# Patient Record
Sex: MALE | Race: BLACK OR AFRICAN AMERICAN | Employment: UNEMPLOYED | URBAN - METROPOLITAN AREA
[De-identification: names, ages, dates, MRNs, and addresses within clinical notes are randomized per-mention and may not be internally consistent; named-entity substitution may affect disease eponyms.]

---

## 2022-04-07 ENCOUNTER — HOSPITAL ENCOUNTER (OUTPATIENT)
Dept: PHYSICAL THERAPY | Age: 37
Discharge: HOME | End: 2022-04-07
Payer: MEDICAID

## 2022-04-07 PROCEDURE — 97014 ELECTRIC STIMULATION THERAPY: CPT

## 2022-04-07 PROCEDURE — 97161 PT EVAL LOW COMPLEX 20 MIN: CPT

## 2022-04-07 PROCEDURE — 97112 NEUROMUSCULAR REEDUCATION: CPT
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
## 2022-04-13 ENCOUNTER — HOSPITAL ENCOUNTER (OUTPATIENT)
Dept: PHYSICAL THERAPY | Age: 37
Discharge: HOME | End: 2022-04-13
Payer: MEDICAID

## 2022-04-13 PROCEDURE — 97110 THERAPEUTIC EXERCISES: CPT

## 2022-04-13 PROCEDURE — 97112 NEUROMUSCULAR REEDUCATION: CPT

## 2022-04-13 PROCEDURE — 97014 ELECTRIC STIMULATION THERAPY: CPT

## 2022-04-13 NOTE — PROGRESS NOTES
PT DAILY TREATMENT NOTE  NON [facility]     Patient Name: Marcello Estrada  KBUZ:3/56/2034  : 1985  [x]  Patient  Verified  Payor: Aquilino Wheeler / Plan: Nas Soria / Product Type: Managed Care Medicaid /    Treatment Area: Other low back pain [M54.59]  Low back pain, unspecified [M54.50]  Other chronic pain [G89.29]       Next MD APPT:   In time: 3:45   Out time: 4:45     Total Treatment Time (min): 55   Visit #: -     SUBJECTIVE  Pain Level (0-10 scale) pre treatment: 2/10       Pain Level (0-10 scale) post treatment:     Any medication changes, allergies to medications, adverse drug reactions, diagnosis change, or new procedure performed?:   [x] No    [] Yes (see summary sheet for update)    Subjective functional status/changes:   [x] No changes reported  Reports minimal back pain, stated he has been doing his exercises and they have been helping. Has been taking his pain medicaion at needed.      OBJECTIVE    Modality rationale: decrease edema, decrease inflammation, decrease pain, increase tissue extensibility and increase muscle contraction/control to improve the patients ability to reduce pain with ambulation and ADLs   Min Type Additional Details   15 [x] Estim: []UnAtt   []Att       []TENS instruct                  []IFC  []Premod   []NMES                     []Other:  []w/US   []w/ice   [x]w/heat  Position:supine with wedge   Location:lower back     []  Ice     []  Heat  []  Ice massage Position:  Location:    []  Traction: [] Cervical       []Lumbar                       [] Prone          []Supine                       []Intermittent   []Continuous Lbs:  []w/heat  []W/heat and Estim    []  Ultrasound: []Continuous   [] Pulsed at:                           []1MHz   []3MHz Location:  W/cm2:      [x] Skin assessment post-treatment:   [x]intact  []redness- no adverse reaction   []redness  adverse reaction:   32 min Therapeutic Exercise:  [] See flow sheet :   Rationale: increase ROM, increase strength, improve coordination, improve balance and increase proprioception to improve the patients ability to ambulate with less back pain. 8 min Neuromuscular Re-education:  []  See flow sheet : PPT, TA bracing and LTR    Rationale: increase ROM, increase strength, improve coordination, improve balance and increase proprioception  to improve the patients ability to reduce pain with ambulation and ADLs      With   [x] TE   [] TA   [] Neuro   [] SC   [] other: Patient Education: [] Review HEP    [] Progressed/Changed HEP based on:   [x] positioning   [] body mechanics   [] transfers   [] Use of heat/ice     [] other:         Other Objective/Functional Measures: We continue with POC and added bike, slant board, forward lunge, 3 way forwards back stretch and wheel. ASSESSMENT/Changes in Function:   Patient tolerated session well, reports no pain or noted any discomfort during exercises. We required verbal/tactile cues with PPT, LTR and TA bracing for technique and decreased compensation . We finish session with heat and ES. Patient reports no pain post session. Encouraged to keep doing his exercises at home   Patient will continue to benefit from skilled PT services to modify and progress therapeutic interventions, address functional mobility deficits, address ROM deficits, address strength deficits, analyze and address soft tissue restrictions, analyze and cue movement patterns, analyze and modify body mechanics/ergonomics and assess and modify postural abnormalities to attain remaining goals. GOALS/Progress towards goals:    Patient Goal (s): more flexibility    []? Met []? Not met []? Partially met     Short Term Goals: To be accomplished in 6 treatments. 1. Patient will report compliance to a progressive HEP. [x] Met [] Not met [] Partially met 4/13   2. Patient will report decrease in pain intensity to a maximum of 5/10. [] Met [] Not met [] Partially met     3.  Patient will demonstrate understanding of proper body mechanics for bending and lifting light objects. [] Met [] Not met [] Partially met     Long Term Goals: To be accomplished in 12-18 treatments. 1. Patient will stand up from a chair without increased pain symptoms. 2. Patient will tolerate ambulating community distances without increased pain. 3. Patient will demo ability to  >20 lbs from floor without increased pain. 4. Patient will report decrease in overall pain to average of 1-2/10 at maximum.     Frequency / Duration: Patient to be seen 2 times per week for 12-18 treatments.   Certification Period: 4/7/22 - 7/6/22    PLAN  []  Continue plan of care  []  Upgrade activities as tolerated      []  Update interventions per flow sheet       []  Discharge due to:  []  Other:    Nadean Seip, PT, DPT 4/13/2022

## 2022-04-20 ENCOUNTER — HOSPITAL ENCOUNTER (OUTPATIENT)
Dept: PHYSICAL THERAPY | Age: 37
Discharge: HOME | End: 2022-04-20
Payer: MEDICAID

## 2022-04-20 PROCEDURE — 97112 NEUROMUSCULAR REEDUCATION: CPT

## 2022-04-20 PROCEDURE — 97110 THERAPEUTIC EXERCISES: CPT

## 2022-04-20 NOTE — PROGRESS NOTES
PT DAILY TREATMENT NOTE  NON [facility]     Patient Name: Herbert Schroeder  Date:2022  : 1985  [x]  Patient  Verified  Payor: Rosanna Maier / Plan: [id number] / Product Type: Managed Care Medicaid /    Treatment Area: Other low back pain [M54.59]  Low back pain, unspecified [M54.50]  Other chronic pain [G89.29]       Next MD APPT:   In time:  Out time:     Total Treatment Time (min): 30  Visit #: 3/12-18     SUBJECTIVE  Pain Level (0-10 scale) pre treatment: 2/10       Pain Level (0-10 scale) post treatment:     Any medication changes, allergies to medications, adverse drug reactions, diagnosis change, or new procedure performed?:   [x] No    [] Yes (see summary sheet for update)    Subjective functional status/changes:   [x] No changes reported  Pt reports he is doing HEP and it helps. 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     OBJECTIVE    Modality rationale: decrease edema, decrease inflammation, decrease pain, increase tissue extensibility and increase muscle contraction/control to improve the patients ability to reduce pain with ambulation and ADLs   Min Type Additional Details    [x] Estim: []UnAtt   []Att       []TENS instruct                  []IFC  []Premod   []NMES                     []Other:  []w/US   []w/ice   [x]w/heat  Position:supine with wedge   Location:lower back     []  Ice     []  Heat  []  Ice massage Position:  Location:    []  Traction: [] Cervical       []Lumbar                       [] Prone          []Supine                       []Intermittent   []Continuous Lbs:  []w/heat  []W/heat and Estim    []  Ultrasound: []Continuous   [] Pulsed at:                           []1MHz   []3MHz Location:  W/cm2:      [x] Skin assessment post-treatment:   [x]intact  []redness- no adverse reaction   []redness  adverse reaction:   15 min Therapeutic Exercise:  [x] See flow sheet :   Rationale: increase ROM, increase strength, improve coordination, improve balance and increase proprioception to improve the patients ability to ambulate with less back pain. 15 min Neuromuscular Re-education:  [x]  See flow sheet : PPT, TA bracing and LTR , posture education   Rationale: increase ROM, increase strength, improve coordination, improve balance and increase proprioception  to improve the patients ability to reduce pain with ambulation and ADLs      With   [x] TE   [] TA   [] Neuro   [] SC   [] other: Patient Education: [x] Review HEP    [] Progressed/Changed HEP based on:   [] positioning   [x] body mechanics   [] transfers   [] Use of heat/ice     [] other:         Other Objective/Functional Measures:  Pt was instructed in the use of proper body mechanics for lifting and doing USP work  Ex per flow sheet, hip ext done leaning onto plinth     ASSESSMENT/Changes in Function: Pt has poor core strength and body mechanics. Was unalbe to lie prone due to c/o breathing issues  Patient will continue to benefit from skilled PT services to modify and progress therapeutic interventions, address functional mobility deficits, address ROM deficits, address strength deficits, analyze and address soft tissue restrictions, analyze and cue movement patterns, analyze and modify body mechanics/ergonomics and assess and modify postural abnormalities to attain remaining goals. GOALS/Progress towards goals:    Patient Goal (s): more flexibility    []? Met []? Not met []? Partially met     Short Term Goals: To be accomplished in 6 treatments. 1. Patient will report compliance to a progressive HEP. [x] Met [] Not met [] Partially met 4/13   2. Patient will report decrease in pain intensity to a maximum of 5/10. [] Met [] Not met [] Partially met     3. Patient will demonstrate understanding of proper body mechanics for bending and lifting light objects. [] Met [] Not met [] Partially met     Long Term Goals: To be accomplished in 12-18 treatments.   1. Patient will stand up from a chair without increased pain symptoms. 2. Patient will tolerate ambulating community distances without increased pain. 3. Patient will demo ability to  >20 lbs from floor without increased pain. 4. Patient will report decrease in overall pain to average of 1-2/10 at maximum.     Frequency / Duration: Patient to be seen 2 times per week for 12-18 treatments.   Certification Period: 4/7/22 - 7/6/22    PLAN  [x]  Continue plan of care  [x]  Upgrade activities as tolerated      [x]  Update interventions per flow sheet       []  Discharge due to:  [x]  Other: continue with body mechanics training    Rosangela Hummel, PT 4/20/2022

## 2022-04-22 ENCOUNTER — HOSPITAL ENCOUNTER (OUTPATIENT)
Dept: PHYSICAL THERAPY | Age: 37
Discharge: HOME | End: 2022-04-22
Payer: MEDICAID

## 2022-04-22 PROCEDURE — 97110 THERAPEUTIC EXERCISES: CPT

## 2022-04-22 PROCEDURE — 97112 NEUROMUSCULAR REEDUCATION: CPT

## 2022-04-22 PROCEDURE — 97014 ELECTRIC STIMULATION THERAPY: CPT

## 2022-04-22 NOTE — PROGRESS NOTES
PT DAILY TREATMENT NOTE  NON [facility]     Patient Name: Jackelin Bateman  Date:2022  : 1985  [x]  Patient  Verified  Payor: Brittani Mancia / Plan: Jace Sesay / Product Type: Managed Care Medicaid /    Treatment Area: Other low back pain [M54.59]  Low back pain, unspecified [M54.50]  Other chronic pain [G89.29]       Next MD APPT:   In time: 1515Out time: 1620    Total Treatment Time (min): 60  Visit #:      SUBJECTIVE  Pain Level (0-10 scale) pre treatment: 4/10       Pain Level (0-10 scale) post treatment: 0/10    Any medication changes, allergies to medications, adverse drug reactions, diagnosis change, or new procedure performed?:   [x] No    [] Yes (see summary sheet for update)    Subjective functional status/changes:   [x] No changes reported  Pt reports his back has been hurting in the middle of is back ( not usual ) since about an hour after therapy 2 days ago and it is still sore. Pt did not do HEP yesterday. Pt is suppose to start work 22 as a .   OBJECTIVE    Modality rationale: decrease edema, decrease inflammation, decrease pain, increase tissue extensibility and increase muscle contraction/control to improve the patients ability to reduce pain with ambulation and ADLs   Min Type Additional Details   15 [x] Estim: [x]UnAtt   []Att       []TENS instruct                  [x]IFC  []Premod   []NMES                     []Other:  []w/US   []w/ice   [x]w/heat  Position:supine with wedge   Location:lower back     []  Ice     []  Heat  []  Ice massage Position:  Location:    []  Traction: [] Cervical       []Lumbar                       [] Prone          []Supine                       []Intermittent   []Continuous Lbs:  []w/heat  []W/heat and Estim    []  Ultrasound: []Continuous   [] Pulsed at:                           []1MHz   []3MHz Location:  W/cm2:      [x] Skin assessment post-treatment:   [x]intact  []redness- no adverse reaction   []redness  adverse reaction:   30 min Therapeutic Exercise:  [x] See flow sheet :   Rationale: increase ROM, increase strength, improve coordination, improve balance and increase proprioception to improve the patients ability to ambulate with less back pain. 15 min Neuromuscular Re-education:  [x]  See flow sheet : PPT, TA bracing and LTR , core strengthening    Rationale: increase ROM, increase strength, improve coordination, improve balance and increase proprioception  to improve the patients ability to reduce pain with ambulation and ADLs      With   [x] TE   [] TA   [] Neuro   [] SC   [x] other: Patient Education: [] Review HEP    [] Progressed/Changed HEP based on:   [] positioning   [] body mechanics   [] transfers   [] Use of heat/ice     [x] other: Need to work on stretching        Other Objective/Functional Measures:   Pt was TTP in the lower thoracic paraspinals on the L. Hip ext over the table LLE reproduced pain. Had pt do prone hip ext prone. Noted spinal and hip extensor weakness and instability. Continued ex per flow sheet, added overhead raises with RSB and hip flexor stretch. ASSESSMENT/Changes in Function: Pt has poor core strength and body mechanics. Pt exhibited extensor weakness of the LEs and paraspinals  Which may have caused the muscle soreness of the L lower thoracic paraspinals. Significant hip flexor and quad tightness. .  The pt continues to have difficulty lying prone. Pt was able to FF lumbar spine without pain after treatment. Patient will continue to benefit from skilled PT services to modify and progress therapeutic interventions, address functional mobility deficits, address ROM deficits, address strength deficits, analyze and address soft tissue restrictions, analyze and cue movement patterns, analyze and modify body mechanics/ergonomics and assess and modify postural abnormalities to attain remaining goals. GOALS/Progress towards goals:    Patient Goal (s): more flexibility    []?  Met []? Not met []? Partially met     Short Term Goals: To be accomplished in 6 treatments. 1. Patient will report compliance to a progressive HEP. [x] Met [] Not met [] Partially met 4/13   2. Patient will report decrease in pain intensity to a maximum of 5/10. [] Met [] Not met [] Partially met     3. Patient will demonstrate understanding of proper body mechanics for bending and lifting light objects. [] Met [] Not met [] Partially met     Long Term Goals: To be accomplished in 12-18 treatments. 1. Patient will stand up from a chair without increased pain symptoms. 2. Patient will tolerate ambulating community distances without increased pain. 3. Patient will demo ability to  >20 lbs from floor without increased pain. 4. Patient will report decrease in overall pain to average of 1-2/10 at maximum.     Frequency / Duration: Patient to be seen 2 times per week for 12-18 treatments.   Certification Period: 4/7/22 - 7/6/22    PLAN  [x]  Continue plan of care  [x]  Upgrade activities as tolerated      [x]  Update interventions per flow sheet       []  Discharge due to:  [x]  Other: continue with body mechanics training    Ruddy Sanchez, PT 4/22/2022

## 2022-04-25 ENCOUNTER — HOSPITAL ENCOUNTER (OUTPATIENT)
Dept: PHYSICAL THERAPY | Age: 37
Discharge: HOME | End: 2022-04-25
Payer: MEDICAID

## 2022-04-25 PROCEDURE — 97110 THERAPEUTIC EXERCISES: CPT

## 2022-04-25 NOTE — PROGRESS NOTES
PT DAILY TREATMENT NOTE  NON [facility]     Patient Name: James Mirza  Date:2022  : 1985  [x]  Patient  Verified  Payor: Anthony Lopez / Plan: [id number] / Product Type: Managed Care Medicaid /    Treatment Area: Other low back pain [M54.59]  Low back pain, unspecified [M54.50]  Other chronic pain [G89.29]       Next MD APPT:   In time: 1:59pm    Out time: 2:48pm    Total Treatment Time (min):45  Visit #:      SUBJECTIVE  Pain Level (0-10 scale) pre treatment: 1/10       Pain Level (0-10 scale) post treatment: 0/10    Any medication changes, allergies to medications, adverse drug reactions, diagnosis change, or new procedure performed?:   [x] No    [] Yes (see summary sheet for update)    Subjective functional status/changes:   [x] No changes reported  Pt reporting mild pain today. Pt stating the therapy has been helping.       OBJECTIVE    Modality rationale: decrease edema, decrease inflammation, decrease pain, increase tissue extensibility and increase muscle contraction/control to improve the patients ability to reduce pain with ambulation and ADLs   Min Type Additional Details   - [x] Estim: [x]UnAtt   []Att       []TENS instruct                  [x]IFC  []Premod   []NMES                     []Other:  []w/US   []w/ice   [x]w/heat  Position:supine with wedge   Location:lower back    15 []  Ice     [x]  Heat  []  Ice massage Position: seated   Location: lower back     []  Traction: [] Cervical       []Lumbar                       [] Prone          []Supine                       []Intermittent   []Continuous Lbs:  []w/heat  []W/heat and Estim    []  Ultrasound: []Continuous   [] Pulsed at:                           []1MHz   []3MHz Location:  W/cm2:      [x] Skin assessment post-treatment:   [x]intact  []redness- no adverse reaction   []redness  adverse reaction:   22 min Therapeutic Exercise:  [x] See flow sheet :   Rationale: increase ROM, increase strength, improve coordination, improve balance and increase proprioception to improve the patients ability to ambulate with less back pain. 10 min Neuromuscular Re-education:  [x]  See flow sheet : PPT, TA bracing, core strengthening    Rationale: increase ROM, increase strength, improve coordination, improve balance and increase proprioception  to improve the patients ability to reduce pain with ambulation and ADLs      With   [] TE   [] TA   [] Neuro   [] SC   [] other: Patient Education: [] Review HEP    [] Progressed/Changed HEP based on:   [] positioning   [] body mechanics   [] transfers   [] Use of heat/ice     [] other: Need to work on stretching        Other Objective/Functional Measures:   Continued previous exercises adding supine marches. ASSESSMENT/Changes in Function:   Pt responded well to tx with no pain at end of session. Pt with more R hamstring tightness versus L. Cues required during PPT, will need reinforcement. Pt is progressing well at this time. Will continue to progress as tolerated. Patient will continue to benefit from skilled PT services to modify and progress therapeutic interventions, address functional mobility deficits, address ROM deficits, address strength deficits, analyze and address soft tissue restrictions, analyze and cue movement patterns, analyze and modify body mechanics/ergonomics and assess and modify postural abnormalities to attain remaining goals. GOALS/Progress towards goals:    Patient Goal (s): more flexibility    []? Met []? Not met []? Partially met     Short Term Goals: To be accomplished in 6 treatments. 1. Patient will report compliance to a progressive HEP. [x] Met [] Not met [] Partially met 4/13   2. Patient will report decrease in pain intensity to a maximum of 5/10. [] Met [] Not met [] Partially met     3. Patient will demonstrate understanding of proper body mechanics for bending and lifting light objects.  [] Met [] Not met [] Partially met     Long Term Goals: To be accomplished in 12-18 treatments. 1. Patient will stand up from a chair without increased pain symptoms. 2. Patient will tolerate ambulating community distances without increased pain. 3. Patient will demo ability to  >20 lbs from floor without increased pain. 4. Patient will report decrease in overall pain to average of 1-2/10 at maximum.     Frequency / Duration: Patient to be seen 2 times per week for 12-18 treatments.   Certification Period: 4/7/22 - 7/6/22    PLAN  [x]  Continue plan of care  [x]  Upgrade activities as tolerated      [x]  Update interventions per flow sheet       []  Discharge due to:  [x]  Other: continue with body mechanics training    Ofelia Yañez, PT, DPT 4/25/2022

## 2022-04-28 ENCOUNTER — APPOINTMENT (OUTPATIENT)
Dept: PHYSICAL THERAPY | Age: 37
End: 2022-04-28
Payer: MEDICAID

## 2022-04-29 ENCOUNTER — HOSPITAL ENCOUNTER (OUTPATIENT)
Dept: PHYSICAL THERAPY | Age: 37
Discharge: HOME | End: 2022-04-29
Payer: MEDICAID

## 2022-04-29 PROCEDURE — 97112 NEUROMUSCULAR REEDUCATION: CPT

## 2022-04-29 PROCEDURE — 97014 ELECTRIC STIMULATION THERAPY: CPT

## 2022-04-29 PROCEDURE — 97110 THERAPEUTIC EXERCISES: CPT

## 2022-04-29 NOTE — PROGRESS NOTES
PT DAILY TREATMENT NOTE  [facility]     Patient Name: Carli Snow  Date:2022  : 1985  [x]  Patient  Verified  Payor: Siobhan Mcgovern / Plan: Joye Nyhan / Product Type: Managed Care Medicaid /    Treatment Area: Other low back pain [M54.59]  Low back pain, unspecified [M54.50]  Other chronic pain [G89.29]       Next MD APPT:   In time: 110pm Out time: 0204pm    Total Treatment Time (min):50  Visit #:      SUBJECTIVE  Pain Level (0-10 scale) pre treatment: 1/10       Pain Level (0-10 scale) post treatment: 0/10    Any medication changes, allergies to medications, adverse drug reactions, diagnosis change, or new procedure performed?:   [x] No    [] Yes (see summary sheet for update)    Subjective functional status/changes:   [x] No changes reported  Pt does not think therapy has helped much. He is trying to get a new job at [employer] working as a .     OBJECTIVE    Modality rationale: decrease edema, decrease inflammation, decrease pain, increase tissue extensibility and increase muscle contraction/control to improve the patients ability to reduce pain with ambulation and ADLs   Min Type Additional Details   15 [x] Estim: [x]UnAtt   []Att       []TENS instruct                  [x]IFC  []Premod   []NMES                     []Other:  []w/US   []w/ice   [x]w/heat  Position:supine with wedge   Location:lower back     []  Ice     [x]  Heat  []  Ice massage Position: seated   Location: lower back     []  Traction: [] Cervical       []Lumbar                       [] Prone          []Supine                       []Intermittent   []Continuous Lbs:  []w/heat  []W/heat and Estim    []  Ultrasound: []Continuous   [] Pulsed at:                           []1MHz   []3MHz Location:  W/cm2:      [x] Skin assessment post-treatment:   [x]intact  []redness- no adverse reaction   []redness  adverse reaction:   25 min Therapeutic Exercise:  [x] See flow sheet :   Rationale: increase ROM, increase strength, improve coordination, improve balance and increase proprioception to improve the patients ability to ambulate with less back pain. 10 min Neuromuscular Re-education:  [x]  See flow sheet : PPT, quadruped exercises    Rationale: increase ROM, increase strength, improve coordination, improve balance and increase proprioception  to improve the patients ability to reduce pain with ambulation and ADLs      With   [] TE   [] TA   [] Neuro   [] SC   [] other: Patient Education: [] Review HEP    [] Progressed/Changed HEP based on:   [] positioning   [] body mechanics   [] transfers   [] Use of heat/ice     [] other: Need to work on stretching        Other Objective/Functional Measures:   Increased lordosis, hip flexor tightness    ASSESSMENT/Changes in Function:   Added quadruped exercises today. Pt tolerated these well and was able to improve lumbopelvic mobility better in quadruped vs hooklying pelvic tilts. Added quadruped hip extension with TA for core strengthening and extensors. Patient will continue to benefit from skilled PT services to modify and progress therapeutic interventions, address functional mobility deficits, address ROM deficits, address strength deficits, analyze and address soft tissue restrictions, analyze and cue movement patterns, analyze and modify body mechanics/ergonomics and assess and modify postural abnormalities to attain remaining goals. GOALS/Progress towards goals:    Patient Goal (s): more flexibility    []? Met []? Not met []? Partially met   Short Term Goals: To be accomplished in 6 treatments. 1. Patient will report compliance to a progressive HEP. [x] Met [] Not met [] Partially met 4/13   2. Patient will report decrease in pain intensity to a maximum of 5/10. [] Met [] Not met [] Partially met   3. Patient will demonstrate understanding of proper body mechanics for bending and lifting light objects. [] Met [] Not met [] Partially met   Long Term Goals:  To be accomplished in 12-18 treatments. 1. Patient will stand up from a chair without increased pain symptoms. 2. Patient will tolerate ambulating community distances without increased pain. 3. Patient will demo ability to  >20 lbs from floor without increased pain. 4. Patient will report decrease in overall pain to average of 1-2/10 at maximum.     Frequency / Duration: Patient to be seen 2 times per week for 12-18 treatments.   Certification Period: 4/7/22 - 7/6/22    PLAN  [x]  Continue plan of care  [x]  Upgrade activities as tolerated      [x]  Update interventions per flow sheet       []  Discharge due to:  [x]  Other: continue with body mechanics training    Agustin Tanner, PT, DPT 4/29/2022

## 2022-05-06 ENCOUNTER — HOSPITAL ENCOUNTER (OUTPATIENT)
Dept: PHYSICAL THERAPY | Age: 37
Discharge: HOME | End: 2022-05-06
Payer: MEDICAID

## 2022-05-06 PROCEDURE — 97110 THERAPEUTIC EXERCISES: CPT

## 2022-05-06 PROCEDURE — 97112 NEUROMUSCULAR REEDUCATION: CPT

## 2022-05-06 PROCEDURE — 97014 ELECTRIC STIMULATION THERAPY: CPT

## 2022-05-11 ENCOUNTER — HOSPITAL ENCOUNTER (OUTPATIENT)
Dept: PHYSICAL THERAPY | Age: 37
Discharge: HOME | End: 2022-05-11
Payer: MEDICAID

## 2022-05-11 PROCEDURE — 97112 NEUROMUSCULAR REEDUCATION: CPT

## 2022-05-11 PROCEDURE — 97014 ELECTRIC STIMULATION THERAPY: CPT

## 2022-05-11 PROCEDURE — 97110 THERAPEUTIC EXERCISES: CPT

## 2022-05-11 NOTE — PROGRESS NOTES
[address] [address] [facility] [address] [facility], [address] [place] [address]  Ph: [phone]    Fax: [phone]  Therapy Progress Report  Name: Amelia Regan  : 1985   MD: Arlyn Harris MD     Medical Diagnosis: Other low back pain [M54.59]  Low back pain, unspecified [M54.50]  Other chronic pain [G89.29]  Start of Care: 22    Visits from Start of Care: 8     Missed Visits: 0  Certification Period: -22    Frequency/Duration: 2 times a week for 18 treatments   Other Objective/Functional Measures:   Spine:   TRUNK ROM:      Flexion:                                  []? ? N/A  [x]? ?WNL  []? ? Minimal  []? ? Moderate  []? ? Major fingertips 7inches from floor tight HS  Extension:                             []? ? N/A  [x]? ?WNL  []? ? Minimal  []? ? Moderate  []? ? Major    Right Lateral Flexion:           []? ? N/A  []? ?WNL  [x]? ? Minimal  []? ? Moderate  []? ? Major    Left Lateral Flexion:              []? ? N/A  []? ?WNL  [x]? ? Minimal  []? ? Moderate  []? ? Major   Rotation to the Right:           []? ? N/A  [x]? ?WNL  []? ? Minimal  []? ? Moderate  []? ? Major   Rotation to the Left:              []? ? N/A  [x]? ?WNL  []? ? Minimal  []? ? Moderate  []? ? Major   Additional comments: minimal pain with ROM testing , Lateral flexion is limited with pain in the L lumbar area with R/L rotation.     Specific joints: *normal values in ()     Hip                                AROM                            PROM                              MMT    R L R L R L   Flexion (120)         5 5   Extension (15)         5 5   Abduction (40)         5 4+   Adduction (30)               IR (40)               ER (40)               Hamstring 90/90: R 28, L 28 degrees                    ASSESSMENT/Changes in Function: The pt is making slow progress towards goals with decreased pain, improved ability to do daily tasks. Force production of hips, lumbar ROM have improved.   The pt has significant core weakness and instability. The pt has not yet returned to work and will bnefit from this time to work on improving body mechanics and core strength. Patient will continue to benefit from skilled PT services to modify and progress therapeutic interventions, address functional mobility deficits, address ROM deficits, address strength deficits, analyze and address soft tissue restrictions, analyze and cue movement patterns, analyze and modify body mechanics/ergonomics and assess and modify postural abnormalities to attain remaining goals. Ampac Score:  24.6%, improved from a disability score of 34.55%  Recommendations: Continue with current POC of core strengthening, stretching and flexibility, body mechanics training, HEP modalities and manual therapy as needed. [x]  Plan of care has been reviewed with PTA. Tucker Mcdonald, PT 5/11/2022     ________________________________________________________________________     Please retain this original for your records.

## 2022-05-11 NOTE — PROGRESS NOTES
PT DAILY TREATMENT NOTE  NON [facility]     Patient Name: Amelia Regan  XYQ  : 1985  [x]  Patient  Verified  Payor: Alexa Dears / Plan: Yazmin Peers / Product Type: Managed Care Medicaid /    Treatment Area: Other low back pain [M54.59]  Low back pain, unspecified [M54.50]  Other chronic pain [G89.29]       Next MD APPT: 22  In time: 9736 Out time: 1450    Total Treatment Time (min): 60  Visit #:      SUBJECTIVE  Pain Level (0-10 scale) pre treatment: 2/10       Pain Level (0-10 scale) post treatment: 0/10    Any medication changes, allergies to medications, adverse drug reactions, diagnosis change, or new procedure performed?:   [x] No    [] Yes (see summary sheet for update)    Subjective functional status/changes:   [x] No changes reported  Pt reports he is about 30 % better since starting therapy. IN the past week pain has stayed the same around 2/10. Pt is not working yet and he does his normal activities at home but avoid consistently heavy lifting. A few times is okay.   OBJECTIVE    Modality rationale: decrease edema, decrease inflammation, decrease pain, increase tissue extensibility and increase muscle contraction/control to improve the patients ability to reduce pain with ambulation and ADLs   Min Type Additional Details   15 [x] Estim: [x]UnAtt   []Att       []TENS instruct                  [x]IFC  []Premod   []NMES                     []Other:  []w/US   []w/ice   [x]w/heat  Position:supine with wedge   Location:lower back     []  Ice     [x]  Heat  []  Ice massage Position: seated   Location: lower back     []  Traction: [] Cervical       []Lumbar                       [] Prone          []Supine                       []Intermittent   []Continuous Lbs:  []w/heat  []W/heat and Estim    []  Ultrasound: []Continuous   [] Pulsed at:                           []1MHz   []3MHz Location:  W/cm2:      [x] Skin assessment post-treatment:   [x]intact  []redness- no adverse reaction   []redness  adverse reaction:   23 min Therapeutic Exercise:  [x] See flow sheet :   Rationale: increase ROM, increase strength, improve coordination, improve balance and increase proprioception to improve the patients ability to ambulate with less back pain. 15 min Neuromuscular Re-education:  [x]  See flow sheet : PPT, quadruped exercises , core strengthening   Rationale: increase ROM, increase strength, improve coordination, improve balance and increase proprioception  to improve the patients ability to reduce pain with ambulation and ADLs      With   [x] TE   [] TA   [] Neuro   [] SC   [] other: Patient Education: [] Review HEP    [] Progressed/Changed HEP based on:   [] positioning   [] body mechanics   [] transfers   [] Use of heat/ice     [x] other: Need to work on stretching        Other Objective/Functional Measures:   Spine:   TRUNK ROM:      Flexion:                                  []? N/A  [x]? WNL  []? Minimal  []? Moderate  []? Major fingertips 7inches from floor tight HS  Extension:                             []? N/A  [x]? WNL  []? Minimal  []? Moderate  []? Major    Right Lateral Flexion:           []? N/A  []? WNL  [x]? Minimal  []? Moderate  []? Major    Left Lateral Flexion:              []? N/A  []? WNL  [x]? Minimal  []? Moderate  []? Major   Rotation to the Right:           []? N/A  [x]? WNL  []? Minimal  []? Moderate  []? Major   Rotation to the Left:              []? N/A  [x]? WNL  []? Minimal  []? Moderate  []?  Major   Additional comments: minimal pain with ROM testing , Lateral flexion is limited with pain in the L lumbar area with R/L rotation.     Specific joints: *normal values in ()     Hip                                AROM                            PROM                              MMT    R L R L R L   Flexion (120)         5 5   Extension (15)         5 5   Abduction (40)         5 4+   Adduction (30)               IR (40)               ER (40)               Hamstring 90/90: R 28, L 28 degrees                 RX: Continued with ex per flow sheet, and performed reassessment      ASSESSMENT/Changes in Function: The pt is making slow progress towards goals with decreased pain, improved ability to do daily tasks. Force production of hips, lumbar ROM have improved. The pt has significant core weakness and instability. The pt has not yet returned to work and will bnefit from this time to work on improving body mechanics and core strength. Patient will continue to benefit from skilled PT services to modify and progress therapeutic interventions, address functional mobility deficits, address ROM deficits, address strength deficits, analyze and address soft tissue restrictions, analyze and cue movement patterns, analyze and modify body mechanics/ergonomics and assess and modify postural abnormalities to attain remaining goals. GOALS/Progress towards goals:    Patient Goal (s): more flexibility    []? Met []? Not met [x]? Partially met  , Improving 5/11/22  Short Term Goals: To be accomplished in 6 treatments. 1. Patient will report compliance to a progressive HEP. [x] Met [] Not met [] Partially met 5/11/22   2. Patient will report decrease in pain intensity to a maximum of 5/10. [x] Met [] Not met [] Partially met 5/11/22  3. Patient will demonstrate understanding of proper body mechanics for bending and lifting light objects. [] Met [x] Not met [] Partially met   Long Term Goals: To be accomplished in 12-18 treatments. 1. Patient will stand up from a chair without increased pain symptoms. [x] Met  [] Not Met [] Partially Met 5/11/22  2. Patient will tolerate ambulating community distances without increased pain. [] Met  [] Not Met [x] Partially Met 5/11/22  3. Patient will demo ability to  >20 lbs from floor without increased pain. [] Met  [] Not Met [x] Partially Met  4. Patient will report decrease in overall pain to average of 1-2/10 at maximum.  [] Met  [x] Not Met [] Partially Met     Frequency / Duration: Patient to be seen 2 times per week for 12-18 treatments.   Certification Period: 4/7/22 - 7/6/22    PLAN  [x]  Continue plan of care  [x]  Upgrade activities as tolerated      [x]  Update interventions per flow sheet       []  Discharge due to:  [x]  Other: continue with body mechanics training, strengthening of core/extensors,     Nina Garza, PT 5/11/2022

## 2022-05-13 ENCOUNTER — HOSPITAL ENCOUNTER (OUTPATIENT)
Dept: PHYSICAL THERAPY | Age: 37
Discharge: HOME | End: 2022-05-13
Payer: MEDICAID

## 2022-05-13 PROCEDURE — 97110 THERAPEUTIC EXERCISES: CPT

## 2022-05-13 PROCEDURE — 97112 NEUROMUSCULAR REEDUCATION: CPT

## 2022-05-13 NOTE — PROGRESS NOTES
PT DAILY TREATMENT NOTE  NON [facility]     Patient Name: Seema Le  FMJQ:  : 1985  [x]  Patient  Verified  Payor: Mary Patel / Plan: Kayode Mitchell / Product Type: Managed Care Medicaid /    Treatment Area: Other low back pain [M54.59]  Low back pain, unspecified [M54.50]  Other chronic pain [G89.29]       Next MD APPT: 22  In time: 9490 Out time: 1435    Total Treatment Time (min): 40  Visit #:      SUBJECTIVE  Pain Level (0-10 scale) pre treatment: 1/10       Pain Level (0-10 scale) post treatment: 0/10    Any medication changes, allergies to medications, adverse drug reactions, diagnosis change, or new procedure performed?:   [x] No    [] Yes (see summary sheet for update)    Subjective functional status/changes:   [x] No changes reported Pt reports the Dr visit worked well and the Dr wants him to continue with therapy. If he does not improve or something changes they will do and MRI and possible injections  Pt reports his back was more painful yesterday but not too bad today. Pt reports he does not usually go to bed until 7 am and is just getting up in the early afternoon. Pt reports he has R groin pain described as pinching and it happens at night also when I lying on the R side.    OBJECTIVE    Modality rationale: decrease edema, decrease inflammation, decrease pain, increase tissue extensibility and increase muscle contraction/control to improve the patients ability to reduce pain with ambulation and ADLs   Min Type Additional Details   15 [x] Estim: [x]UnAtt   []Att       []TENS instruct                  [x]IFC  []Premod   []NMES                     []Other:  []w/US   []w/ice   [x]w/heat  Position:supine with wedge   Location:lower back     []  Ice     [x]  Heat  []  Ice massage Position: seated   Location: lower back     []  Traction: [] Cervical       []Lumbar                       [] Prone          []Supine                       []Intermittent   []Continuous Lbs:  []w/heat  []W/heat and Estim    []  Ultrasound: []Continuous   [] Pulsed at:                           []1MHz   []3MHz Location:  W/cm2:      [x] Skin assessment post-treatment:   [x]intact  []redness- no adverse reaction   []redness  adverse reaction:   30 min Therapeutic Exercise:  [x] See flow sheet :   Rationale: increase ROM, increase strength, improve coordination, improve balance and increase proprioception to improve the patients ability to ambulate with less back pain. 10 min Neuromuscular Re-education:  [x]  See flow sheet : PPT, quadruped exercises , core strengthening   Rationale: increase ROM, increase strength, improve coordination, improve balance and increase proprioception  to improve the patients ability to reduce pain with ambulation and ADLs      With   [x] TE   [] TA   [] Neuro   [] SC   [] other: Patient Education: [] Review HEP    [] Progressed/Changed HEP based on:   [] positioning   [] body mechanics   [] transfers   [] Use of heat/ice     [x] other: Need to work on stretching        Other Objective/Functional Measures:   Pt is 10 -15 late for each PT session. Added wall slides which were very difficult. Demonstrated the need to strengthen LEs for lifting and using proper body mechanics. Reported a \"pinch\" sensation with R hip flexion during L hip flexor stretch   ASSESSMENT/Changes in Function: Pt is doing better with stretching and PPT/catn camel but still needs cuing. Quad weakness is evident as pt really struggled to hold 15 seconds. Encouraged pt to be on time, but stated he was always late for everything.    Patient will continue to benefit from skilled PT services to modify and progress therapeutic interventions, address functional mobility deficits, address ROM deficits, address strength deficits, analyze and address soft tissue restrictions, analyze and cue movement patterns, analyze and modify body mechanics/ergonomics and assess and modify postural abnormalities to attain remaining goals. GOALS/Progress towards goals:    Patient Goal (s): more flexibility    []? Met []? Not met [x]? Partially met  , Improving 5/11/22  Short Term Goals: To be accomplished in 6 treatments. 1. Patient will report compliance to a progressive HEP. [x] Met [] Not met [] Partially met 5/11/22   2. Patient will report decrease in pain intensity to a maximum of 5/10. [x] Met [] Not met [] Partially met 5/11/22  3. Patient will demonstrate understanding of proper body mechanics for bending and lifting light objects. [] Met [x] Not met [] Partially met   Long Term Goals: To be accomplished in 12-18 treatments. 1. Patient will stand up from a chair without increased pain symptoms. [x] Met  [] Not Met [] Partially Met 5/11/22  2. Patient will tolerate ambulating community distances without increased pain. [] Met  [] Not Met [x] Partially Met 5/11/22  3. Patient will demo ability to  >20 lbs from floor without increased pain. [] Met  [] Not Met [x] Partially Met  4. Patient will report decrease in overall pain to average of 1-2/10 at maximum. [] Met  [x] Not Met [] Partially Met     Frequency / Duration: Patient to be seen 2 times per week for 12-18 treatments.   Certification Period: 4/7/22 - 7/6/22    PLAN  [x]  Continue plan of care  [x]  Upgrade activities as tolerated      [x]  Update interventions per flow sheet       []  Discharge due to:  [x]  Other: continue with body mechanics training, strengthening of core/extensors, Check R hip    Raudel Tee, PT 5/13/2022

## 2022-05-18 ENCOUNTER — APPOINTMENT (OUTPATIENT)
Dept: PHYSICAL THERAPY | Age: 37
End: 2022-05-18
Payer: MEDICAID

## 2022-05-20 ENCOUNTER — HOSPITAL ENCOUNTER (OUTPATIENT)
Dept: PHYSICAL THERAPY | Age: 37
Discharge: HOME | End: 2022-05-20
Payer: MEDICAID

## 2022-05-20 PROCEDURE — 97110 THERAPEUTIC EXERCISES: CPT

## 2022-05-20 PROCEDURE — 97112 NEUROMUSCULAR REEDUCATION: CPT

## 2022-05-20 NOTE — PROGRESS NOTES
PT DAILY TREATMENT NOTE  [facility]     Patient Name: Beau Joel  Date:2022  : 1985  [x]  Patient  Verified  Payor: Juan Hernandez / Plan: [id number] / [employer] Type: Managed Care Medicaid /    Treatment Area: Other low back pain [M54.59]  Low back pain, unspecified [M54.50]  Other chronic pain [G89.29]       Next MD APPT: 22  In time: 2:08  Out time: 2:55     Total Treatment Time (min): 52  Visit #: 10/12-18     SUBJECTIVE  Pain Level (0-10 scale) pre treatment: 1/10       Pain Level (0-10 scale) post treatment: 0/10    Any medication changes, allergies to medications, adverse drug reactions, diagnosis change, or new procedure performed?:   [x] No    [] Yes (see summary sheet for update)    Subjective functional status/changes:   [x] No changes reported   Patient reports no back pain but some R hip pain points to grater trochanter area.      OBJECTIVE    Modality rationale: decrease edema, decrease inflammation, decrease pain, increase tissue extensibility and increase muscle contraction/control to improve the patients ability to reduce pain with ambulation and ADLs   Min Type Additional Details    [] Estim: [x]UnAtt   []Att       []TENS instruct                  [x]IFC  []Premod   []NMES                     []Other:  []w/US   []w/ice   [x]w/heat  Position:supine with wedge   Location:lower back     []  Ice     [x]  Heat  []  Ice massage Position: seated   Location: lower back     []  Traction: [] Cervical       []Lumbar                       [] Prone          []Supine                       []Intermittent   []Continuous Lbs:  []w/heat  []W/heat and Estim    []  Ultrasound: []Continuous   [] Pulsed at:                           []1MHz   []3MHz Location:  W/cm2:      [] Skin assessment post-treatment:   [x]intact  []redness- no adverse reaction   []redness  adverse reaction:   35 min Therapeutic Exercise:  [x] See flow sheet :   Rationale: increase ROM, increase strength, improve coordination, improve balance and increase proprioception to improve the patients ability to ambulate with less back pain. 12 min Neuromuscular Re-education:  [x]  See flow sheet : PPT, quadruped exercises , core strengthening   Rationale: increase ROM, increase strength, improve coordination, improve balance and increase proprioception  to improve the patients ability to reduce pain with ambulation and ADLs      With   [x] TE   [] TA   [] Neuro   [] SC   [] other: Patient Education: [] Review HEP    [] Progressed/Changed HEP based on:   [] positioning   [] body mechanics   [] transfers   [] Use of heat/ice     [x] other: Need to work on stretching        Other Objective/Functional Measures: Added scap retraction with TB to work on core stability with knees slight flex - noted some difficulty with activity and patient reports his legs feel weak. Added scap extension and SLR   Added Lunge stretch with step  vs hip flexor in supine due to pinching sensation at hip. Added piriformis stretch  Minimal cues required with PPT for technique. ASSESSMENT/Changes in Function: We continue to progress with exercises, patient continue to presents with decreased core stability, quad weakness with wall slide and standing TB exercises. Patient also required cue for technique with PPT . We did noted improved with a quadruped exercises but still benefit from tactile/verbal cues for technique. Patient will continue to benefit from skilled PT services to modify and progress therapeutic interventions, address functional mobility deficits, address ROM deficits, address strength deficits, analyze and address soft tissue restrictions, analyze and cue movement patterns, analyze and modify body mechanics/ergonomics and assess and modify postural abnormalities to attain remaining goals. GOALS/Progress towards goals:    Patient Goal (s): more flexibility    []? Met []? Not met [x]?  Partially met  , Improving 5/11/22  Short Term Goals: To be accomplished in 6 treatments. 1. Patient will report compliance to a progressive HEP. [x] Met [] Not met [] Partially met 5/11/22   2. Patient will report decrease in pain intensity to a maximum of 5/10. [x] Met [] Not met [] Partially met 5/11/22  3. Patient will demonstrate understanding of proper body mechanics for bending and lifting light objects. [] Met [x] Not met [] Partially met   Long Term Goals: To be accomplished in 12-18 treatments. 1. Patient will stand up from a chair without increased pain symptoms. [x] Met  [] Not Met [] Partially Met 5/11/22  2. Patient will tolerate ambulating community distances without increased pain. [] Met  [] Not Met [x] Partially Met 5/11/22  3. Patient will demo ability to  >20 lbs from floor without increased pain. [] Met  [] Not Met [x] Partially Met  4. Patient will report decrease in overall pain to average of 1-2/10 at maximum. [] Met  [x] Not Met [] Partially Met     Frequency / Duration: Patient to be seen 2 times per week for 12-18 treatments.   Certification Period: 4/7/22 - 7/6/22    PLAN  [x]  Continue plan of care  [x]  Upgrade activities as tolerated      [x]  Update interventions per flow sheet       []  Discharge due to:  [x]  Other: continue with body mechanics training, strengthening of core/extensors, Check R hip    Kristi Nance, PT, DPT 5/20/2022

## 2022-06-02 ENCOUNTER — HOSPITAL ENCOUNTER (OUTPATIENT)
Dept: PHYSICAL THERAPY | Age: 37
Discharge: HOME | End: 2022-06-02
Payer: MEDICAID

## 2022-06-02 PROCEDURE — 97110 THERAPEUTIC EXERCISES: CPT

## 2022-06-02 NOTE — PROGRESS NOTES
PT DAILY TREATMENT NOTE  [facility]     Patient Name: Opal Merino  NMBL:[id number]  : 1985  [x]  Patient  Verified  Payor: Ameya Santo / Plan: [facility] MEDICAID / Product Type: Managed Care Medicaid /    Treatment Area: Other low back pain [M54.59]  Low back pain, unspecified [M54.50]  Other chronic pain [G89.29]       Next MD APPT:   In time: 2:15 pm  Out time: 3:00pm     Total Treatment Time (min): 45  Visit #:      SUBJECTIVE  Pain Level (0-10 scale) pre treatment: 1/10       Pain Level (0-10 scale) post treatment: 0/10    Any medication changes, allergies to medications, adverse drug reactions, diagnosis change, or new procedure performed?:   [x] No    [] Yes (see summary sheet for update)    Subjective functional status/changes:   [x] No changes reported   Pt reports little pain today but in the days this week leading up to the visit he was at a 4/10 with no major changes in routine or noted aggravating factors. Did not perform any relieving factors because exercises were hurting. Pt was 15 minutes late due to car trouble.      OBJECTIVE    Pt declined modalities at end of session   Modality rationale: decrease edema, decrease inflammation, decrease pain, increase tissue extensibility and increase muscle contraction/control to improve the patients ability to reduce pain with ambulation and ADLs   Min Type Additional Details    [] Estim: [x]UnAtt   []Att       []TENS instruct                  [x]IFC  []Premod   []NMES                     []Other:  []w/US   []w/ice   [x]w/heat  Position:supine with wedge   Location:lower back     []  Ice     [x]  Heat  []  Ice massage Position: seated   Location: lower back     []  Traction: [] Cervical       []Lumbar                       [] Prone          []Supine                       []Intermittent   []Continuous Lbs:  []w/heat  []W/heat and Estim    []  Ultrasound: []Continuous   [] Pulsed at:                           []1MHz   []3MHz Location:  W/cm2: [] Skin assessment post-treatment:   []intact  []redness- no adverse reaction   []redness - adverse reaction:   45 min Therapeutic Exercise:  [x] See flow sheet :   Rationale: increase ROM, increase strength, improve coordination, improve balance and increase proprioception to improve the patients ability to ambulate with less back pain. min Neuromuscular Re-education:  [x]  See flow sheet : PPT, quadruped exercises , core strengthening   Rationale: increase ROM, increase strength, improve coordination, improve balance and increase proprioception  to improve the patients ability to reduce pain with ambulation and ADLs      With   [x] TE   [] TA   [] Neuro   [] SC   [] other: Patient Education: [] Review HEP    [] Progressed/Changed HEP based on:   [] positioning   [] body mechanics   [] transfers   [] Use of heat/ice     [x] other: Educated on at home hot pack and doing exercises before pain comes on         Other Objective/Functional Measures: Added in leg press and step ups/ step down taps       ASSESSMENT/Changes in Function:  Pt did well with increasing reps on core and quad strengthening exercises today. Added in leg press and step up variations to target quad weakness. Focused on finding rep range of fatigue / right before loss of form to get increased volume of training. Pt was able to up weight for last set on leg press. Focused a lot more today on quad control both concentric and eccentric. Pt reports they are always at 0 when they leave. 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    Patient will continue to benefit from skilled PT services to modify and progress therapeutic interventions, address functional mobility deficits, address ROM deficits, address strength deficits, analyze and address soft tissue restrictions, analyze and cue movement patterns, analyze and modify body mechanics/ergonomics and assess and modify postural abnormalities to attain remaining goals. GOALS/Progress towards goals:    Patient Goal (s): more flexibility    []? Met []? Not met [x]? Partially met  , Improving 5/11/22  Short Term Goals: To be accomplished in 6 treatments. 1. Patient will report compliance to a progressive HEP. [x] Met [] Not met [] Partially met 5/11/22   2. Patient will report decrease in pain intensity to a maximum of 5/10. [x] Met [] Not met [] Partially met 5/11/22  3. Patient will demonstrate understanding of proper body mechanics for bending and lifting light objects. [] Met [x] Not met [] Partially met   Long Term Goals: To be accomplished in 12-18 treatments. 1. Patient will stand up from a chair without increased pain symptoms. [x] Met  [] Not Met [] Partially Met 5/11/22  2. Patient will tolerate ambulating community distances without increased pain. [] Met  [] Not Met [x] Partially Met 5/11/22  3. Patient will demo ability to  >20 lbs from floor without increased pain. [] Met  [] Not Met [x] Partially Met  4. Patient will report decrease in overall pain to average of 1-2/10 at maximum. [] Met  [x] Not Met [] Partially Met     Frequency / Duration: Patient to be seen 2 times per week for 12-18 treatments. Certification Period: 4/7/22 - 7/6/22    PLAN  [x]  Continue plan of care  [x]  Upgrade activities as tolerated      [x]  Update interventions per flow sheet       []  Discharge due to:  [x]  Other: continue with body mechanics training, strengthening of core/knee extensors    Patient was informed and agreed to allow student to observe and participate in medical care. Patient was seen by student and licensed therapist who is in agreement with the above documentation.       Neo Perdomo, SPT 6/2/2022

## 2022-06-08 ENCOUNTER — HOSPITAL ENCOUNTER (OUTPATIENT)
Dept: PHYSICAL THERAPY | Age: 37
Discharge: HOME | End: 2022-06-08
Payer: MEDICAID

## 2022-06-08 PROCEDURE — 97110 THERAPEUTIC EXERCISES: CPT

## 2022-06-08 PROCEDURE — 97014 ELECTRIC STIMULATION THERAPY: CPT

## 2022-06-08 NOTE — PROGRESS NOTES
PT DAILY TREATMENT NOTE  [facility]     Patient Name: Edith Metz  Date:2022  : 1985  [x]  Patient  Verified  Payor: Flor Zelaya / Plan: [employer] / Product Type: Managed Care Medicaid /    Treatment Area: Other low back pain [M54.59]  Low back pain, unspecified [M54.50]  Other chronic pain [G89.29]       Next MD APPT:   In time: 2:40 pm  Out time: 3:40pm     Total Treatment Time (min): 60  Visit #:      SUBJECTIVE  Pain Level (0-10 scale) pre treatment: 2/10       Pain Level (0-10 scale) post treatment: 0/10    Any medication changes, allergies to medications, adverse drug reactions, diagnosis change, or new procedure performed?:   [x] No    [] Yes (see summary sheet for update)    Subjective functional status/changes:   [x] No changes reported   Pt reports pain at 2/10, no changes to report. States sitting feels best for his LB.    OBJECTIVE    Modality rationale: decrease edema, decrease inflammation, decrease pain, increase tissue extensibility and increase muscle contraction/control to improve the patients ability to reduce pain with ambulation and ADLs   Min Type Additional Details   15 [] Estim: [x]UnAtt   []Att       []TENS instruct                  [x]IFC  []Premod   []NMES                     []Other:  []w/US   [x]w/ice   []w/heat  Position:supine with wedge   Location:lower back     []  Ice     []  Heat  []  Ice massage Position: seated   Location: lower back     []  Traction: [] Cervical       []Lumbar                       [] Prone          []Supine                       []Intermittent   []Continuous Lbs:  []w/heat  []W/heat and Estim    []  Ultrasound: []Continuous   [] Pulsed at:                           []1MHz   []3MHz Location:  W/cm2:      [x] Skin assessment post-treatment:   [x]intact  []redness- no adverse reaction   []redness - adverse reaction:   45 min Therapeutic Exercise:  [x] See flow sheet :   Rationale: increase ROM, increase strength, improve coordination, improve balance and increase proprioception to improve the patients ability to ambulate with less back pain. min Neuromuscular Re-education:  [x]  See flow sheet : PPT, quadruped exercises , core strengthening   Rationale: increase ROM, increase strength, improve coordination, improve balance and increase proprioception  to improve the patients ability to reduce pain with ambulation and ADLs      With   [x] TE   [] TA   [] Neuro   [] SC   [] other: Patient Education: [] Review HEP    [] Progressed/Changed HEP based on:   [] positioning   [] body mechanics   [] transfers   [] Use of heat/ice     [x] other: Educated on at home hot pack and doing exercises before pain comes on         Other Objective/Functional Measures:       ASSESSMENT/Changes in Function:  Pt tolerated session well. Pt required cues for proper core activation with therex, demonstrating fair carryover needing repeated cues for core activation. Continues to demonstrate core and LE quad eccentric weakness. Patient will continue to benefit from skilled PT services to modify and progress therapeutic interventions, address functional mobility deficits, address ROM deficits, address strength deficits, analyze and address soft tissue restrictions, analyze and cue movement patterns, analyze and modify body mechanics/ergonomics and assess and modify postural abnormalities to attain remaining goals. GOALS/Progress towards goals:    Patient Goal (s): more flexibility    []? Met []? Not met [x]? Partially met  , Improving 5/11/22  Short Term Goals: To be accomplished in 6 treatments. 1. Patient will report compliance to a progressive HEP. [x] Met [] Not met [] Partially met 5/11/22   2. Patient will report decrease in pain intensity to a maximum of 5/10. [x] Met [] Not met [] Partially met 5/11/22  3. Patient will demonstrate understanding of proper body mechanics for bending and lifting light objects.  [] Met [x] Not met [] Partially met   Long Term Goals: To be accomplished in 12-18 treatments. 1. Patient will stand up from a chair without increased pain symptoms. [x] Met  [] Not Met [] Partially Met 5/11/22  2. Patient will tolerate ambulating community distances without increased pain. [] Met  [] Not Met [x] Partially Met 5/11/22  3. Patient will demo ability to  >20 lbs from floor without increased pain. [] Met  [] Not Met [x] Partially Met  4. Patient will report decrease in overall pain to average of 1-2/10 at maximum. [] Met  [x] Not Met [] Partially Met     Frequency / Duration: Patient to be seen 2 times per week for 12-18 treatments. Certification Period: 4/7/22 - 7/6/22    PLAN  [x]  Continue plan of care  [x]  Upgrade activities as tolerated      [x]  Update interventions per flow sheet       []  Discharge due to:  [x]  Other: continue with body mechanics training, strengthening of core/knee extensors    Patient was informed and agreed to allow student to observe and participate in medical care. Patient was seen by student and licensed therapist who is in agreement with the above documentation.       Sonu Borja, PTA 6/8/2022

## 2022-06-10 ENCOUNTER — APPOINTMENT (OUTPATIENT)
Dept: PHYSICAL THERAPY | Age: 37
End: 2022-06-10
Payer: MEDICAID

## 2022-06-16 ENCOUNTER — HOSPITAL ENCOUNTER (OUTPATIENT)
Dept: PHYSICAL THERAPY | Age: 37
Discharge: HOME | End: 2022-06-16
Payer: MEDICAID

## 2022-06-16 PROCEDURE — 97014 ELECTRIC STIMULATION THERAPY: CPT

## 2022-06-16 PROCEDURE — 97110 THERAPEUTIC EXERCISES: CPT

## 2022-06-16 NOTE — PROGRESS NOTES
PT DAILY TREATMENT NOTE  [facility]     Patient Name: Amelia Regan  MDJO:  : 1985  [x]  Patient  Verified  Payor: Alexa Dears / Plan: [id number] / Product Type: Managed Care Medicaid /    Treatment Area: Other low back pain [M54.59]  Low back pain, unspecified [M54.50]  Other chronic pain [G89.29]       Next MD APPT:   In time: 1:39 pm  Out time: 0241pm    Total Treatment Time (min): 62  Visit #:      SUBJECTIVE  Pain Level (0-10 scale) pre treatment: 2/10       Pain Level (0-10 scale) post treatment: 0/10    Any medication changes, allergies to medications, adverse drug reactions, diagnosis change, or new procedure performed?:   [x] No    [] Yes (see summary sheet for update)    Subjective functional status/changes:   [] No changes reported   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  He returns to [place] on   After last session had a lot of pain   Pt states he has made 0% improvement. Some exercises feel good and help his pain but he still has episodes of pain at same intensity and frequency as before.      OBJECTIVE    Modality rationale: decrease edema, decrease inflammation, decrease pain, increase tissue extensibility and increase muscle contraction/control to improve the patients ability to reduce pain with ambulation and ADLs   Min Type Additional Details   15 [] Estim: [x]UnAtt   []Att       []TENS instruct                  [x]IFC  []Premod   []NMES                     []Other:  []w/US   [x]w/ice   []w/heat  Position:supine with wedge   Location:lower back     []  Ice     []  Heat  []  Ice massage Position: seated   Location: lower back     []  Traction: [] Cervical       []Lumbar                       [] Prone          []Supine                       []Intermittent   []Continuous Lbs:  []w/heat  []W/heat and Estim    []  Ultrasound: []Continuous   [] Pulsed at:                           []1MHz   []3MHz Location:  W/cm2:      [x] Skin assessment post-treatment:   [x]intact  []redness- no adverse reaction   []redness - adverse reaction:   38 min Therapeutic Exercise:  [x] See flow sheet :   Rationale: increase ROM, increase strength, improve coordination, improve balance and increase proprioception to improve the patients ability to ambulate with less back pain. min Neuromuscular Re-education:  [x]  See flow sheet : PPT, quadruped exercises , core strengthening   Rationale: increase ROM, increase strength, improve coordination, improve balance and increase proprioception  to improve the patients ability to reduce pain with ambulation and ADLs      With   [x] TE   [] TA   [] Neuro   [] SC   [] other: Patient Education: [] Review HEP    [] Progressed/Changed HEP based on:   [] positioning   [] body mechanics   [] transfers   [] Use of heat/ice     [x] other: body mechanics - provided HO for lifting techniques         Other Objective/Functional Measures:   Trunk ROM WFL  Poor control and technique with lunge  Squat functional  Pain range continues up to 6/10 per VAS    ASSESSMENT/Changes in Function:  Pt has completed 13 treatment sessions. We have focused on improving core and hip strength, body mechanics, and pain reduction. Pt reports benefit from stretching/ROM therex to reduce pain but does not see any lasting benefit from therapy. Note the pt's Am-PAC score has improved from 34% to 20% indicating some functional improvement. He has met or partially met 4 of 7 goals. Most limited with poor core / transverse abdominus recruitment, decrease eccentric quad strength, and fair body mechanics. Pt may benefit from continued PT to improve core recruitment and hip/knee strength to prevent back injury with job duties. Pt notes he is returning to [place] next week and is hoping to get an MRI.     Patient will continue to benefit from skilled PT services to modify and progress therapeutic interventions, address functional mobility deficits, address ROM deficits, address strength deficits, analyze and address soft tissue restrictions, analyze and cue movement patterns, analyze and modify body mechanics/ergonomics and assess and modify postural abnormalities to attain remaining goals. GOALS/Progress towards goals:    Patient Goal (s): more flexibility    []? Met []? Not met [x]? Partially met  , Improving 5/11/22  Short Term Goals: To be accomplished in 6 treatments. 1. Patient will report compliance to a progressive HEP. [x] Met [] Not met [] Partially met 5/11/22   2. Patient will report decrease in pain intensity to a maximum of 5/10. [] Met [x] Not met [] Partially met 6/16/22 max of 6-7/10  3. Patient will demonstrate understanding of proper body mechanics for bending and lifting light objects. [] Met [x] Not met [] Partially met   Long Term Goals: To be accomplished in 12-18 treatments. 1. Patient will stand up from a chair without increased pain symptoms. [x] Met  [] Not Met [] Partially Met 5/11/22  2. Patient will tolerate ambulating community distances without increased pain. [] Met  [] Not Met [x] Partially Met 6/16/22 depends on day but usually can walk at least 20 mins. 3. Patient will demo ability to  >20 lbs from floor without increased pain. [] Met  [] Not Met [x] Partially Met  4. Patient will report decrease in overall pain to average of 1-2/10 at maximum. [] Met  [x] Not Met [] Partially Met 6/16/22     Frequency / Duration: Patient to be seen 2 times per week for 12-18 treatments.   Certification Period: 4/7/22 - 7/6/22    PLAN  [x]  Continue plan of care  [x]  Upgrade activities as tolerated      [x]  Update interventions per flow sheet       []  Discharge due to:  [x]  Other: pt will call after MD appt regarding if he will continue PT    Valorie Vega, PT, DPT 6/16/2022

## 2022-06-16 NOTE — PROGRESS NOTES
[address] Suite [facility], [facility]  Ph: [phone]    Fax: [phone]  Therapy Progress Report  Name: Moo Salter  : 1985   MD: Maranda Pugh MD     Medical Diagnosis: Other low back pain [M54.59]  Low back pain, unspecified [M54.50]  Other chronic pain [G89.29]  Start of Care: 22 Visits from Start of Care: 13   Missed Visits: 4  Certification Period: 22 - 22    Frequency/Duration: 2 times a week for 18 treatments   Summary of Care: Pt has completed 13 treatment sessions. We have focused on improving core and hip strength, body mechanics, and pain reduction. Pt reports benefit from stretching/ROM therex to reduce pain but does not see any lasting benefit from therapy. Note the pt's Am-PAC score has improved from 34% to 20% indicating some functional improvement. He has met or partially met 4 of 7 goals. Most limited with poor core / transverse abdominus recruitment, decrease eccentric quad strength, and fair body mechanics. Pt may benefit from continued PT to improve core recruitment and hip/knee strength to prevent back injury with job duties. Pt notes he is returning to MD next week and is hoping to get an MRI. GOALS/Progress towards goals:     Patient Goal (s): more flexibility    []? ? Met []? ? Not met [x]? ? Partially met  , Improving 22  Short Term Goals: To be accomplished in 6 treatments. 1. Patient will report compliance to a progressive HEP. [x]? Met []? Not met []? Partially met 22   2. Patient will report decrease in pain intensity to a maximum of 5/10. []? Met [x]? Not met []? Partially met 22 max of 6-7/10  3. Patient will demonstrate understanding of proper body mechanics for bending and lifting light objects. []? Met [x]? Not met []? Partially met   [address] be accomplished in 12-18 treatments. 1. Patient will stand up from a chair without increased pain symptoms. [x]? Met  []? Not Met []? Partially Met 5/11/22  2. Patient will tolerate ambulating community distances without increased pain. []? Met  []? Not Met [x]? Partially Met 6/16/22 depends on day but usually can walk at least 20 mins. 3. Patient will demo ability to  >20 lbs from floor without increased pain. []? Met  []? Not Met [x]? Partially Met  4. Patient will report decrease in overall pain to average of 1-2/10 at maximum. []? Met  [x]? Not Met []? Partially Met 6/16/22      [facility] Score:  19.96%  Recommendations: Return to MD  [x]  Plan of care has been reviewed with PTA. Diana Conway, PT, DPT 6/16/2022     ________________________________________________________________________     Please retain this original for your records.

## 2022-06-27 ENCOUNTER — TRANSCRIBE ORDER (OUTPATIENT)
Dept: SCHEDULING | Age: 37
End: 2022-06-27

## 2022-06-27 DIAGNOSIS — M54.50 LOW BACK PAIN: Primary | ICD-10-CM

## 2022-06-27 DIAGNOSIS — G89.29 CHRONIC PAIN: ICD-10-CM

## 2022-07-06 ENCOUNTER — HOSPITAL ENCOUNTER (OUTPATIENT)
Dept: MRI IMAGING | Age: 37
Discharge: HOME | End: 2022-07-06
Payer: MEDICAID

## 2022-07-06 DIAGNOSIS — G89.29 CHRONIC PAIN: ICD-10-CM

## 2022-07-06 DIAGNOSIS — M54.50 LOW BACK PAIN: ICD-10-CM

## 2022-07-06 PROCEDURE — 72148 MRI LUMBAR SPINE W/O DYE: CPT

## 2022-07-26 NOTE — PROGRESS NOTES
[address]  Ph: [phone]  Fax: [phone]    Medicaid Discharge Summary 2-15    Patient name: Monty Saravia  : 1985  Provider#: [id number]  Referral source: Nubia Thomas MD      Medical/Treatment Diagnosis: Other low back pain [M54.59]  Low back pain, unspecified [M54.50]  Other chronic pain [G89.29]     Prior Hospitalization: see medical history     Comorbidities: See Plan of Care  Prior Level of Function: See Plan of Care  Medications: Verified on Patient Summary List  Start of Care: 22   Onset Date:(see initial plan of care)  Visits from Start of Care: 13  Missed Visits: 4  Certification Period : 22 to 22    Assessment/Summary of care/GOALS:   Pt completed 13 treatment sessions with focus on improving core and hip strength, body mechanics, and pain reduction. Pt reports benefit from stretching/ROM therex to reduce pain but does not see any lasting benefit from therapy. Note the pt's Am-PAC score has improved from 34% to 20% indicating some functional improvement. He has met or partially met 4 of 7 goals. Most limited with poor core / transverse abdominus recruitment, decrease eccentric quad strength, and fair body mechanics. After last appointment, the pt returned to MD with hope to obtain MRI. Pt did not schedule any further follow ups so will be discharged at this time. Thank you for the referral       GOALS/Progress towards goals:  Patient Goal (s): more flexibility    [] Met [] Not met [x] Partially met  , Improving 22  Short Term Goals: To be accomplished in 6 treatments. 1. Patient will report compliance to a progressive HEP. [x] Met [] Not met [] Partially met 22  2. Patient will report decrease in pain intensity to a maximum of 5/10. [] Met [x] Not met [] Partially met 22 max of 6-7/10  3.  Patient will demonstrate understanding of proper body mechanics for bending and lifting light objects. [] Met [x] Not met [] Partially met  Long Term Goals: To be accomplished in 12-18 treatments. 1. Patient will stand up from a chair without increased pain symptoms. [x] Met  [] Not Met [] Partially Met 5/11/22  2. Patient will tolerate ambulating community distances without increased pain. [] Met  [] Not Met [x] Partially Met 6/16/22 depends on day but usually can walk at least 20 mins. 3. Patient will demo ability to  >20 lbs from floor without increased pain. [] Met  [] Not Met [x] Partially Met  4. Patient will report decrease in overall pain to average of 1-2/10 at maximum. [] Met  [x] Not Met [] Partially Met 6/16/22      [facility] Score:  19.96%    RECOMMENDATIONS:  [x]Discontinue therapy:   []Patient has reached or is progressing toward set goals and is independent with HEP   []Patient is non-compliant or has abdicated   [x]Due to lack of appreciable progress towards set goals   []Patient was hospitalized or suffered illness that impacted ability to continue therapy   [x]Other: return to MD Lesia Ma, PT, DPT 7/26/2022   Retain this original for your records. If you are unable to process this request in 24 hours, please contact our office.   ________________________________________________________________________  NOTE TO PHYSICIAN:  Please complete the following and FAX to: Eben [facility]BARR[facility]:  Fax: [phone]   ____ I have read the above report and request that my patient be discharged from therapy.     Physician's Signature:______Torin Spivey _______________________________________________ Date:______7/27/22_______Time:_____________     Stephany Tony MD

## 2022-12-11 ENCOUNTER — HOSPITAL ENCOUNTER (EMERGENCY)
Age: 37
Discharge: HOME | End: 2022-12-11
Attending: EMERGENCY MEDICINE
Payer: MEDICAID

## 2022-12-11 ENCOUNTER — APPOINTMENT (OUTPATIENT)
Dept: CT IMAGING | Age: 37
End: 2022-12-11
Attending: EMERGENCY MEDICINE
Payer: MEDICAID

## 2022-12-11 VITALS
WEIGHT: 270 LBS | DIASTOLIC BLOOD PRESSURE: 105 MMHG | HEART RATE: 94 BPM | TEMPERATURE: 98.2 F | OXYGEN SATURATION: 100 % | SYSTOLIC BLOOD PRESSURE: 134 MMHG | RESPIRATION RATE: 20 BRPM | BODY MASS INDEX: 39.99 KG/M2 | HEIGHT: 69 IN

## 2022-12-11 DIAGNOSIS — F19.10 DRUG ABUSE (HCC): ICD-10-CM

## 2022-12-11 DIAGNOSIS — S00.12XA CONTUSION OF LEFT EYELID, INITIAL ENCOUNTER: Primary | ICD-10-CM

## 2022-12-11 LAB
ANION GAP SERPL CALC-SCNC: 16 MMOL/L (ref 5–15)
BASOPHILS # BLD: 0 K/UL (ref 0–0.1)
BASOPHILS NFR BLD: 0 % (ref 0–1)
BUN SERPL-MCNC: 11 MG/DL (ref 6–20)
BUN/CREAT SERPL: 13 (ref 12–20)
CA-I BLD-MCNC: 9.5 MG/DL (ref 8.5–10.1)
CHLORIDE SERPL-SCNC: 99 MMOL/L (ref 97–108)
CO2 SERPL-SCNC: 23 MMOL/L (ref 21–32)
CREAT SERPL-MCNC: 0.82 MG/DL (ref 0.7–1.3)
DIFFERENTIAL METHOD BLD: ABNORMAL
EOSINOPHIL # BLD: 0.1 K/UL (ref 0–0.4)
EOSINOPHIL NFR BLD: 1 % (ref 0–7)
ERYTHROCYTE [DISTWIDTH] IN BLOOD BY AUTOMATED COUNT: 19.6 % (ref 11.5–14.5)
ETHANOL SERPL-MCNC: 76 MG/DL
GLUCOSE SERPL-MCNC: 103 MG/DL (ref 65–100)
HCT VFR BLD AUTO: 38.1 % (ref 36.6–50.3)
HGB BLD-MCNC: 12.4 G/DL (ref 12.1–17)
IMM GRANULOCYTES # BLD AUTO: 0 K/UL (ref 0–0.04)
IMM GRANULOCYTES NFR BLD AUTO: 0 % (ref 0–0.5)
LYMPHOCYTES # BLD: 3.1 K/UL (ref 0.8–3.5)
LYMPHOCYTES NFR BLD: 29 % (ref 12–49)
MCH RBC QN AUTO: 20.1 PG (ref 26–34)
MCHC RBC AUTO-ENTMCNC: 32.5 G/DL (ref 30–36.5)
MCV RBC AUTO: 61.8 FL (ref 80–99)
MONOCYTES # BLD: 0.7 K/UL (ref 0–1)
MONOCYTES NFR BLD: 7 % (ref 5–13)
NEUTS SEG # BLD: 6.8 K/UL (ref 1.8–8)
NEUTS SEG NFR BLD: 63 % (ref 32–75)
PLATELET # BLD AUTO: 519 K/UL (ref 150–400)
PMV BLD AUTO: 9.4 FL (ref 8.9–12.9)
POTASSIUM SERPL-SCNC: 3.1 MMOL/L (ref 3.5–5.1)
RBC # BLD AUTO: 6.17 M/UL (ref 4.1–5.7)
SODIUM SERPL-SCNC: 138 MMOL/L (ref 136–145)
WBC # BLD AUTO: 10.7 K/UL (ref 4.1–11.1)

## 2022-12-11 PROCEDURE — 82077 ASSAY SPEC XCP UR&BREATH IA: CPT

## 2022-12-11 PROCEDURE — 96375 TX/PRO/DX INJ NEW DRUG ADDON: CPT

## 2022-12-11 PROCEDURE — 90714 TD VACC NO PRESV 7 YRS+ IM: CPT | Performed by: EMERGENCY MEDICINE

## 2022-12-11 PROCEDURE — 96374 THER/PROPH/DIAG INJ IV PUSH: CPT

## 2022-12-11 PROCEDURE — 74011250636 HC RX REV CODE- 250/636: Performed by: EMERGENCY MEDICINE

## 2022-12-11 PROCEDURE — 70486 CT MAXILLOFACIAL W/O DYE: CPT

## 2022-12-11 PROCEDURE — 90471 IMMUNIZATION ADMIN: CPT

## 2022-12-11 PROCEDURE — 70450 CT HEAD/BRAIN W/O DYE: CPT

## 2022-12-11 PROCEDURE — 72125 CT NECK SPINE W/O DYE: CPT

## 2022-12-11 PROCEDURE — 85025 COMPLETE CBC W/AUTO DIFF WBC: CPT

## 2022-12-11 PROCEDURE — 80048 BASIC METABOLIC PNL TOTAL CA: CPT

## 2022-12-11 PROCEDURE — 36415 COLL VENOUS BLD VENIPUNCTURE: CPT

## 2022-12-11 PROCEDURE — 99284 EMERGENCY DEPT VISIT MOD MDM: CPT

## 2022-12-11 RX ORDER — KETOROLAC TROMETHAMINE 30 MG/ML
30 INJECTION, SOLUTION INTRAMUSCULAR; INTRAVENOUS
Status: COMPLETED | OUTPATIENT
Start: 2022-12-11 | End: 2022-12-11

## 2022-12-11 RX ORDER — NAPROXEN 500 MG/1
500 TABLET ORAL
Qty: 20 TABLET | Refills: 0 | Status: SHIPPED | OUTPATIENT
Start: 2022-12-11

## 2022-12-11 RX ORDER — ONDANSETRON 2 MG/ML
4 INJECTION INTRAMUSCULAR; INTRAVENOUS
Status: COMPLETED | OUTPATIENT
Start: 2022-12-11 | End: 2022-12-11

## 2022-12-11 RX ADMIN — TETANUS AND DIPHTHERIA TOXOIDS ADSORBED 0.5 ML: 2; 2 INJECTION INTRAMUSCULAR at 19:23

## 2022-12-11 RX ADMIN — ONDANSETRON 4 MG: 2 INJECTION INTRAMUSCULAR; INTRAVENOUS at 18:38

## 2022-12-11 RX ADMIN — KETOROLAC TROMETHAMINE 30 MG: 30 INJECTION, SOLUTION INTRAMUSCULAR at 19:50

## 2022-12-11 NOTE — ED TRIAGE NOTES
PT. TO ED VIA POV WITH C/O FALL. PT. ADMITS TO ETOH TODAY BUT CONFUSED ABOUT HOW INJURIES HAPPEN TO FACE/HEAD. HEMATOMA TO LEFT EYE, BLEEDING FROM NOSE AND VOMITING. normal...

## 2022-12-11 NOTE — Clinical Note
[address]  [address] [facility] [place] [address]  [phone]    Work/School Note    Date: 12/11/2022    To Whom It May concern:    Karen Warner was seen and treated today in the emergency room by the following provider(s):  Attending Provider: King Staton MD.      Karen Warner is excused from work/school on 12/11/2022 through 12/13/2022. He is medically clear to return to work/school on 12/14/2022.          Sincerely,          Palma Porter MD

## 2022-12-11 NOTE — ED NOTES
FRIEND AT BEDSIDE [address] HAVING HICCUPS IN CAR  AND STATED HE NEEDED TO GET OUT OF CAR, PT. GOT OUT OF CAR STOOD UP IN FELL INTO CAR,  SHE STATES HE KEPT REPEATING STUFF ON THE WAY HERE TO ED.

## 2022-12-12 NOTE — ED PROVIDER NOTES
EMERGENCY DEPARTMENT HISTORY AND PHYSICAL EXAM      Date: 12/11/2022  Patient Name: Sandi Thornton    History of Presenting Illness     Chief Complaint   Patient presents with    Head Injury    Fall       History Provided By: Patient    HPI: Sandi Thornton, 40 y.o. male who said that he was sitting in a car at a park today and felt nauseated and got out of the car to get sick and tripped and fell onto the left side of his face. Unknown whether or not there was loss of consciousness. He is not on a blood thinning medication. He does endorse alcohol use. Symptoms are moderate he does arrive via wheelchair with his face bloodied. Is not up-to-date on tetanus    There are no other complaints, changes, or physical findings at this time. Past History     Past Medical History:  Past Medical History:   Diagnosis Date    GERD (gastroesophageal reflux disease)     HTN (hypertension)        Past Surgical History:  Past Surgical History:   Procedure Laterality Date    HX ORTHOPAEDIC Right     wrist with surgical implants, repair    HX OTHER SURGICAL Left     \"eye\" they took it out and removed the black part and put it back in\"    HX TONSILLECTOMY         Family History:  History reviewed. No pertinent family history. Social History:  Social History     Tobacco Use    Smoking status: Every Day     Types: Cigarettes    Smokeless tobacco: Never   Vaping Use    Vaping Use: Never used   Substance Use Topics    Alcohol use: Yes    Drug use: Yes     Types: Marijuana, Cocaine       Allergies:  No Known Allergies    PCP: Asher Mabry MD    No current facility-administered medications on file prior to encounter. No current outpatient medications on file prior to encounter. Review of Systems   Review of Systems   Constitutional:  Positive for fatigue. Negative for appetite change, chills and fever. HENT: Negative. Negative for congestion and sinus pain. Eyes: Negative.   Negative for pain and visual disturbance. Respiratory: Negative. Negative for chest tightness and shortness of breath. Cardiovascular: Negative. Negative for chest pain. Gastrointestinal: Negative. Negative for abdominal pain, diarrhea, nausea and vomiting. Genitourinary: Negative. Negative for difficulty urinating. No discharge   Musculoskeletal: Negative. Negative for arthralgias. Skin:  Positive for wound. Negative for rash. Neurological:  Positive for headaches. Negative for weakness. Hematological: Negative. Psychiatric/Behavioral: Negative. Negative for agitation. The patient is not nervous/anxious. All other systems reviewed and are negative. Physical Exam   Physical Exam  Vitals and nursing note reviewed. Constitutional:       General: He is not in acute distress. Appearance: He is well-developed. HENT:      Head: Normocephalic and atraumatic. Nose: Nose normal.      Mouth/Throat:      Mouth: Mucous membranes are moist.      Pharynx: Oropharynx is clear. No oropharyngeal exudate. Eyes:      General:         Right eye: No discharge. Left eye: No discharge. Conjunctiva/sclera: Conjunctivae normal.      Pupils: Pupils are equal, round, and reactive to light. Comments: Swollen left eyelid with abrasions about the face and left side of the zygoma   Cardiovascular:      Rate and Rhythm: Normal rate and regular rhythm. Chest Wall: PMI is not displaced. No thrill. Heart sounds: Normal heart sounds. No murmur heard. No friction rub. No gallop. Pulmonary:      Effort: Pulmonary effort is normal. No respiratory distress. Breath sounds: Normal breath sounds. No wheezing or rales. Chest:      Chest wall: No tenderness. Abdominal:      General: Bowel sounds are normal. There is no distension. Palpations: Abdomen is soft. There is no mass. Tenderness: There is no abdominal tenderness. There is no guarding or rebound.    Musculoskeletal: General: Normal range of motion. Cervical back: Normal range of motion and neck supple. Lymphadenopathy:      Cervical: No cervical adenopathy. Skin:     General: Skin is warm and dry. Capillary Refill: Capillary refill takes less than 2 seconds. Findings: No erythema or rash. Neurological:      Mental Status: He is alert. He is disoriented. Cranial Nerves: No cranial nerve deficit. Coordination: Coordination normal.      Comments: Mild confusion   Psychiatric:         Mood and Affect: Mood normal.         Behavior: Behavior normal.       Lab and Diagnostic Study Results   Labs -     Recent Results (from the past 12 hour(s))   CBC WITH AUTOMATED DIFF    Collection Time: 12/11/22  6:30 PM   Result Value Ref Range    WBC 10.7 4.1 - 11.1 K/uL    RBC 6.17 (H) 4.10 - 5.70 M/uL    HGB 12.4 12.1 - 17.0 g/dL    HCT 38.1 36.6 - 50.3 %    MCV 61.8 (L) 80.0 - 99.0 FL    MCH 20.1 (L) 26.0 - 34.0 PG    MCHC 32.5 30.0 - 36.5 g/dL    RDW 19.6 (H) 11.5 - 14.5 %    PLATELET 029 (H) 360 - 400 K/uL    MPV 9.4 8.9 - 12.9 FL    NEUTROPHILS 63 32 - 75 %    LYMPHOCYTES 29 12 - 49 %    MONOCYTES 7 5 - 13 %    EOSINOPHILS 1 0 - 7 %    BASOPHILS 0 0 - 1 %    IMMATURE GRANULOCYTES 0 0.0 - 0.5 %    ABS. NEUTROPHILS 6.8 1.8 - 8.0 K/UL    ABS. LYMPHOCYTES 3.1 0.8 - 3.5 K/UL    ABS. MONOCYTES 0.7 0.0 - 1.0 K/UL    ABS. EOSINOPHILS 0.1 0.0 - 0.4 K/UL    ABS. BASOPHILS 0.0 0.0 - 0.1 K/UL    ABS. IMM.  GRANS. 0.0 0.00 - 0.04 K/UL    DF AUTOMATED     METABOLIC PANEL, BASIC    Collection Time: 12/11/22  6:30 PM   Result Value Ref Range    Sodium 138 136 - 145 mmol/L    Potassium 3.1 (L) 3.5 - 5.1 mmol/L    Chloride 99 97 - 108 mmol/L    CO2 23 21 - 32 mmol/L    Anion gap 16 (H) 5 - 15 mmol/L    Glucose 103 (H) 65 - 100 mg/dL    BUN 11 6 - 20 mg/dL    Creatinine 0.82 0.70 - 1.30 mg/dL    BUN/Creatinine ratio 13 12 - 20      eGFR >60 >60 ml/min/1.73m2    Calcium 9.5 8.5 - 10.1 mg/dL   ETHYL ALCOHOL    Collection Time: 12/11/22  6:30 PM   Result Value Ref Range    ALCOHOL(ETHYL),SERUM 76 (H) <10 mg/dL       Radiologic Studies -   @lastxrresult@  CT Results  (Last 48 hours)                 12/11/22 1855  CT HEAD WO CONT Final result    Impression:  No acute intracranial abnormality. Left frontal scalp swelling. Narrative:  EXAM: CT HEAD WO CONT       INDICATION: GLF, facial trauma, AMS       COMPARISON: None. CONTRAST: None. TECHNIQUE: Unenhanced CT of the head was performed using 5 mm images. Brain and   bone windows were generated. Coronal and sagittal reformats. CT dose reduction   was achieved through use of a standardized protocol tailored for this   examination and automatic exposure control for dose modulation. FINDINGS:   The ventricles and sulci are normal in size, shape and configuration. . There is   no significant white matter disease. There is no intracranial hemorrhage,   extra-axial collection, or mass effect. The basilar cisterns are open. No CT   evidence of acute infarct. The bone windows demonstrate no abnormalities. Left frontal scalp swelling   without fracture. Mucosal thickening of the maxillary and ethmoid sinuses. .           12/11/22 1855  CT MAXILLOFACIAL WO CONT Final result    Impression:  No facial fracture. Left periorbital and left frontal scalp soft tissue swelling. Narrative:  EXAM: CT MAXILLOFACIAL WO CONT       INDICATION: GLF, facial trauma       COMPARISON: None. CONTRAST:   None. TECHNIQUE:  Multislice helical CT of the facial bones was performed in the axial   plane without intravenous contrast administration. Coronal and sagittal   reformations were generated. CT dose reduction was achieved through use of a   standardized protocol tailored for this examination and automatic exposure   control for dose modulation. FINDINGS:       Bones:  There is no fracture or other osseous abnormality       Paranasal sinuses: Mucus retention cyst in the right maxillary sinus. Mucosal   thickening in the left maxillary sinus and ethmoid air cells. Orbits: Left periorbital soft tissue swelling and left frontal scalp soft tissue   swelling. Base of brain and soft tissues: Within normal limits. No evidence of mass. Miscellaneous: N/A            12/11/22 1855  CT SPINE CERV WO CONT Final result    Impression:  No fracture or traumatic malalignment. Paraspinal tissues are within normal limits. Narrative:  EXAM:  CT CERVICAL SPINE WITHOUT CONTRAST       INDICATION: GLF, AMS. COMPARISON: None. CONTRAST:  None. TECHNIQUE: Multislice helical CT of the cervical spine was performed without   intravenous contrast administration. Sagittal and coronal reformats were   generated. CT dose reduction was achieved through use of a standardized   protocol tailored for this examination and automatic exposure control for dose   modulation. FINDINGS:       The alignment is within normal limits. There is no fracture or compression   deformity. The odontoid process is intact. The craniocervical junction is within   normal limits. The incidentally imaged soft tissues are within normal limits. CXR Results  (Last 48 hours)      None            Medical Decision Making and ED Course   Differential Diagnosis & Medical Decision Making Provider Note:   Cervical spine fracture, subdural, facial fractures. Will assess with CT of the cervical spine, head and max face. Will apply cervical collar. - I am the first provider for this patient. I reviewed the vital signs, available nursing notes, past medical history, past surgical history, family history and social history. The patients presenting problems have been discussed, and they are in agreement with the care plan formulated and outlined with them. I have encouraged them to ask questions as they arise throughout their visit.     Vital Signs-Reviewed the patient's vital signs. Patient Vitals for the past 12 hrs:   Temp Pulse Resp BP SpO2   12/11/22 1855 98.2 °F (36.8 °C) 94 20 (!) 134/105 100 %   12/11/22 1824 98.2 °F (36.8 °C) (!) 107 20 (!) 138/91 98 %       ED Course:   No appreciable acute fracture. Will treat with analgesic for patient's pain. Will update tetanus shot    Patient's face has been cleaned. No appreciable suturable wounds at this point time. We will provide naproxen as an outpatient. Have counseled the patient on drug abuse and its cessation. Procedures   Performed by: James Munoz MD  Procedures      Disposition   Disposition: Condition stable    DISCHARGE PLAN:  1. There are no discharge medications for this patient. 2.   Follow-up Information    None       3. Return to ED if worse   4. Current Discharge Medication List        START taking these medications    Details   naproxen (NAPROSYN) 500 mg tablet Take 1 Tablet by mouth every twelve (12) hours as needed for Pain. Qty: 20 Tablet, Refills: 0  Start date: 12/11/2022              Remove if admitted/transferred    Diagnosis/Clinical Impression     Clinical Impression:   1. Contusion of left eyelid, initial encounter    2. Drug abuse [facility])        Attestations: Mason Pierce MD, am the primary clinician of record. Please note that this dictation was completed with [employer], the computer voice recognition software. Quite often unanticipated grammatical, syntax, homophones, and other interpretive errors are inadvertently transcribed by the computer software. Please disregard these errors. Please excuse any errors that have escaped final proofreading. Thank you.

## 2022-12-12 NOTE — ED NOTES
Pt returned from radiology services at this time, pt with multiple facial punctures, laceration to forehead, bleeding and swelling left eye orbit, bleeding swelling left nare and nose

## 2023-08-09 ENCOUNTER — HOSPITAL ENCOUNTER (EMERGENCY)
Facility: HOSPITAL | Age: 38
Discharge: HOME | End: 2023-08-09
Attending: STUDENT IN AN ORGANIZED HEALTH CARE EDUCATION/TRAINING PROGRAM
Payer: MEDICAID

## 2023-08-09 ENCOUNTER — APPOINTMENT (OUTPATIENT)
Facility: HOSPITAL | Age: 38
End: 2023-08-09
Payer: MEDICAID

## 2023-08-09 VITALS
HEIGHT: 69 IN | DIASTOLIC BLOOD PRESSURE: 80 MMHG | RESPIRATION RATE: 16 BRPM | SYSTOLIC BLOOD PRESSURE: 137 MMHG | BODY MASS INDEX: 39.99 KG/M2 | WEIGHT: 270 LBS | OXYGEN SATURATION: 100 % | TEMPERATURE: 98.5 F | HEART RATE: 64 BPM

## 2023-08-09 DIAGNOSIS — B34.9 VIRAL ILLNESS: ICD-10-CM

## 2023-08-09 DIAGNOSIS — R52 BODY ACHES: Primary | ICD-10-CM

## 2023-08-09 DIAGNOSIS — M79.10 MYALGIA: ICD-10-CM

## 2023-08-09 LAB
ALBUMIN SERPL-MCNC: 4 G/DL (ref 3.5–5)
ALBUMIN/GLOB SERPL: 1.2 (ref 1.1–2.2)
ALP SERPL-CCNC: 83 U/L (ref 45–117)
ALT SERPL-CCNC: 48 U/L (ref 12–78)
ANION GAP SERPL CALC-SCNC: 4 MMOL/L (ref 5–15)
APPEARANCE UR: CLEAR
AST SERPL W P-5'-P-CCNC: 20 U/L (ref 15–37)
BACTERIA URNS QL MICRO: NEGATIVE /HPF
BASOPHILS # BLD: 0 K/UL (ref 0–0.1)
BASOPHILS NFR BLD: 0 % (ref 0–1)
BILIRUB SERPL-MCNC: 0.3 MG/DL (ref 0.2–1)
BILIRUB UR QL: NEGATIVE
BUN SERPL-MCNC: 10 MG/DL (ref 6–20)
BUN/CREAT SERPL: 13 (ref 12–20)
CA-I BLD-MCNC: 9.2 MG/DL (ref 8.5–10.1)
CHLORIDE SERPL-SCNC: 106 MMOL/L (ref 97–108)
CO2 SERPL-SCNC: 30 MMOL/L (ref 21–32)
COLOR UR: ABNORMAL
CREAT SERPL-MCNC: 0.8 MG/DL (ref 0.7–1.3)
DIFFERENTIAL METHOD BLD: ABNORMAL
EKG ATRIAL RATE: 76 BPM
EKG DIAGNOSIS: NORMAL
EKG Q-T INTERVAL: 418 MS
EKG QRS DURATION: 140 MS
EKG QTC CALCULATION (BAZETT): 470 MS
EKG R AXIS: 70 DEGREES
EKG T AXIS: -54 DEGREES
EKG VENTRICULAR RATE: 76 BPM
EOSINOPHIL # BLD: 0.1 K/UL (ref 0–0.4)
EOSINOPHIL NFR BLD: 1 % (ref 0–7)
EPITH CASTS URNS QL MICRO: ABNORMAL /LPF
ERYTHROCYTE [DISTWIDTH] IN BLOOD BY AUTOMATED COUNT: 21 % (ref 11.5–14.5)
GLOBULIN SER CALC-MCNC: 3.4 G/DL (ref 2–4)
GLUCOSE SERPL-MCNC: 85 MG/DL (ref 65–100)
GLUCOSE UR STRIP.AUTO-MCNC: NEGATIVE MG/DL
HCT VFR BLD AUTO: 40.7 % (ref 36.6–50.3)
HGB BLD-MCNC: 12.7 G/DL (ref 12.1–17)
HGB UR QL STRIP: NEGATIVE
IMM GRANULOCYTES # BLD AUTO: 0 K/UL (ref 0–0.04)
IMM GRANULOCYTES NFR BLD AUTO: 0 % (ref 0–0.5)
KETONES UR QL STRIP.AUTO: NEGATIVE MG/DL
LEUKOCYTE ESTERASE UR QL STRIP.AUTO: NEGATIVE
LIPASE SERPL-CCNC: 110 U/L (ref 73–393)
LYMPHOCYTES # BLD: 2.2 K/UL (ref 0.8–3.5)
LYMPHOCYTES NFR BLD: 23 % (ref 12–49)
MCH RBC QN AUTO: 20.4 PG (ref 26–34)
MCHC RBC AUTO-ENTMCNC: 31.2 G/DL (ref 30–36.5)
MCV RBC AUTO: 65.4 FL (ref 80–99)
MONOCYTES # BLD: 0.8 K/UL (ref 0–1)
MONOCYTES NFR BLD: 8 % (ref 5–13)
MUCOUS THREADS URNS QL MICRO: ABNORMAL /LPF
NEUTS SEG # BLD: 6.6 K/UL (ref 1.8–8)
NEUTS SEG NFR BLD: 68 % (ref 32–75)
NITRITE UR QL STRIP.AUTO: NEGATIVE
NRBC # BLD: 0 K/UL (ref 0–0.01)
NRBC BLD-RTO: 0 PER 100 WBC
PH UR STRIP: 7 (ref 5–8)
PLATELET # BLD AUTO: 422 K/UL (ref 150–400)
PMV BLD AUTO: 9.8 FL (ref 8.9–12.9)
POTASSIUM SERPL-SCNC: 4.2 MMOL/L (ref 3.5–5.1)
PROT SERPL-MCNC: 7.4 G/DL (ref 6.4–8.2)
PROT UR STRIP-MCNC: NEGATIVE MG/DL
RBC # BLD AUTO: 6.22 M/UL (ref 4.1–5.7)
RBC #/AREA URNS HPF: ABNORMAL /HPF (ref 0–5)
RBC MORPH BLD: ABNORMAL
SODIUM SERPL-SCNC: 140 MMOL/L (ref 136–145)
URINE CULTURE IF INDICATED: ABNORMAL
UROBILINOGEN UR QL STRIP.AUTO: 0.1 EU/DL (ref 0.1–1)
WBC # BLD AUTO: 9.7 K/UL (ref 4.1–11.1)
WBC URNS QL MICRO: ABNORMAL /HPF (ref 0–4)

## 2023-08-09 PROCEDURE — 81001 URINALYSIS AUTO W/SCOPE: CPT

## 2023-08-09 PROCEDURE — 96361 HYDRATE IV INFUSION ADD-ON: CPT

## 2023-08-09 PROCEDURE — 93005 ELECTROCARDIOGRAM TRACING: CPT

## 2023-08-09 PROCEDURE — 99285 EMERGENCY DEPT VISIT HI MDM: CPT

## 2023-08-09 PROCEDURE — 2500000003 HC RX 250 WO HCPCS

## 2023-08-09 PROCEDURE — 2580000003 HC RX 258

## 2023-08-09 PROCEDURE — 85025 COMPLETE CBC W/AUTO DIFF WBC: CPT

## 2023-08-09 PROCEDURE — 6360000004 HC RX CONTRAST MEDICATION: Performed by: STUDENT IN AN ORGANIZED HEALTH CARE EDUCATION/TRAINING PROGRAM

## 2023-08-09 PROCEDURE — 74177 CT ABD & PELVIS W/CONTRAST: CPT

## 2023-08-09 PROCEDURE — 83690 ASSAY OF LIPASE: CPT

## 2023-08-09 PROCEDURE — 80053 COMPREHEN METABOLIC PANEL: CPT

## 2023-08-09 PROCEDURE — 6370000000 HC RX 637 (ALT 250 FOR IP)

## 2023-08-09 PROCEDURE — 87040 BLOOD CULTURE FOR BACTERIA: CPT

## 2023-08-09 PROCEDURE — 6360000002 HC RX W HCPCS

## 2023-08-09 PROCEDURE — 96375 TX/PRO/DX INJ NEW DRUG ADDON: CPT

## 2023-08-09 PROCEDURE — 96374 THER/PROPH/DIAG INJ IV PUSH: CPT

## 2023-08-09 RX ORDER — METHOCARBAMOL 750 MG/1
750 TABLET, FILM COATED ORAL 4 TIMES DAILY
Qty: 40 TABLET | Refills: 0 | Status: SHIPPED | OUTPATIENT
Start: 2023-08-09 | End: 2023-08-19

## 2023-08-09 RX ORDER — IBUPROFEN 600 MG/1
600 TABLET ORAL 3 TIMES DAILY PRN
Qty: 30 TABLET | Refills: 0 | Status: SHIPPED | OUTPATIENT
Start: 2023-08-09

## 2023-08-09 RX ORDER — KETOROLAC TROMETHAMINE 30 MG/ML
30 INJECTION, SOLUTION INTRAMUSCULAR; INTRAVENOUS
Status: COMPLETED | OUTPATIENT
Start: 2023-08-09 | End: 2023-08-09

## 2023-08-09 RX ORDER — ACETAMINOPHEN 500 MG
1000 TABLET ORAL 3 TIMES DAILY PRN
Qty: 30 TABLET | Refills: 0 | Status: SHIPPED | OUTPATIENT
Start: 2023-08-09 | End: 2023-08-19

## 2023-08-09 RX ORDER — MAGNESIUM HYDROXIDE/ALUMINUM HYDROXICE/SIMETHICONE 120; 1200; 1200 MG/30ML; MG/30ML; MG/30ML
30 SUSPENSION ORAL EVERY 6 HOURS PRN
Status: DISCONTINUED | OUTPATIENT
Start: 2023-08-09 | End: 2023-08-09 | Stop reason: HOSPADM

## 2023-08-09 RX ORDER — 0.9 % SODIUM CHLORIDE 0.9 %
1000 INTRAVENOUS SOLUTION INTRAVENOUS ONCE
Status: COMPLETED | OUTPATIENT
Start: 2023-08-09 | End: 2023-08-09

## 2023-08-09 RX ORDER — LIDOCAINE HYDROCHLORIDE 20 MG/ML
15 SOLUTION OROPHARYNGEAL
Status: COMPLETED | OUTPATIENT
Start: 2023-08-09 | End: 2023-08-09

## 2023-08-09 RX ORDER — METHOCARBAMOL 100 MG/ML
1000 INJECTION, SOLUTION INTRAMUSCULAR; INTRAVENOUS ONCE
Status: COMPLETED | OUTPATIENT
Start: 2023-08-09 | End: 2023-08-09

## 2023-08-09 RX ADMIN — Medication 15 ML: at 21:27

## 2023-08-09 RX ADMIN — METHOCARBAMOL 1000 MG: 100 INJECTION INTRAMUSCULAR; INTRAVENOUS at 18:36

## 2023-08-09 RX ADMIN — KETOROLAC TROMETHAMINE 30 MG: 30 INJECTION, SOLUTION INTRAMUSCULAR; INTRAVENOUS at 17:49

## 2023-08-09 RX ADMIN — SODIUM CHLORIDE 1000 ML: 9 INJECTION, SOLUTION INTRAVENOUS at 17:34

## 2023-08-09 RX ADMIN — IOPAMIDOL 100 ML: 755 INJECTION, SOLUTION INTRAVENOUS at 16:52

## 2023-08-09 RX ADMIN — ALUMINUM HYDROXIDE, MAGNESIUM HYDROXIDE, AND SIMETHICONE 30 ML: 200; 200; 20 SUSPENSION ORAL at 21:28

## 2023-08-09 RX ADMIN — SODIUM CHLORIDE, PRESERVATIVE FREE 20 MG: 5 INJECTION INTRAVENOUS at 20:17

## 2023-08-09 ASSESSMENT — PAIN SCALES - GENERAL: PAINLEVEL_OUTOF10: 7

## 2023-08-09 ASSESSMENT — LIFESTYLE VARIABLES
HOW MANY STANDARD DRINKS CONTAINING ALCOHOL DO YOU HAVE ON A TYPICAL DAY: PATIENT DOES NOT DRINK
HOW OFTEN DO YOU HAVE A DRINK CONTAINING ALCOHOL: NEVER

## 2023-08-09 ASSESSMENT — PAIN DESCRIPTION - PAIN TYPE: TYPE: ACUTE PAIN

## 2023-08-09 ASSESSMENT — PAIN - FUNCTIONAL ASSESSMENT: PAIN_FUNCTIONAL_ASSESSMENT: 0-10

## 2023-08-09 ASSESSMENT — PAIN DESCRIPTION - DESCRIPTORS: DESCRIPTORS: CRAMPING

## 2023-08-09 NOTE — ED PROVIDER NOTES
Below    Interpretation per the Radiologist below, if available at the time of this note:  CT ABDOMEN PELVIS W IV CONTRAST Additional Contrast? None   Final Result   No evidence of acute abdominal or pelvic process. Specifically, there is no   evidence of colonic injury or perforation. ED COURSE and DIFFERENTIAL DIAGNOSIS/MDM   CC/HPI Summary, DDx, ED Course, and Reassessment: Patient presents with generalized bodyaches and abdominal pain 1 day after endoscopy and colonoscopy. Differential diagnosis to include viral illness, bacteremia, Pancreatitis, peritonitis, acute appendicitis, upper GI perforation, lowered perforation, dehydration, electrolyte abnormalities. CBC pertinent for elevated RBCs and elevated platelet count, microcytosis, patient currently being followed by hematologist, no leukocytosis. CMP with in normal limits, no evidence of electrolyte abnormalities. Lipase within normal limits, blood cultures drawn, patient unable to provide urine sample until end of visit will follow-up with microscopy and cultures if normal values seen. Provided symptomatic treatment with normal saline bolus, IV Toradol and Robaxin. CT of the abdomen pelvis demonstrates no evidence of acute abdominal or pelvic process, no evidence of colonic injury or perforation. History, physical exam and available lab work/imaging discussed today with Dr. Chantel Wells . On reexamination patient reports mild symptom improvement stating that most of his discomfort is in the upper abdomen with radiation up to his chest consistent with his current symptoms. Patient treated with GI cocktail before being discharged from the department. Patient deemed stable for discharge with return precautions provided.   Patient provided with p.o. analgesics and muscle relaxers and instructed to follow-up with GI provider and PCP    Records Reviewed (source and summary of external notes): Prior medical records and Nursing notes    Vitals:

## 2023-08-09 NOTE — ED TRIAGE NOTES
Patient states that he had an endoscopy and colonoscopy done yesterday and complains of his whole body hurting today.

## 2023-08-13 LAB
BACTERIA SPEC CULT: NORMAL
BACTERIA SPEC CULT: NORMAL
Lab: NORMAL
Lab: NORMAL

## 2023-08-15 LAB
BACTERIA SPEC CULT: NORMAL
BACTERIA SPEC CULT: NORMAL
Lab: NORMAL
Lab: NORMAL